# Patient Record
Sex: FEMALE | Race: WHITE | Employment: FULL TIME | URBAN - METROPOLITAN AREA
[De-identification: names, ages, dates, MRNs, and addresses within clinical notes are randomized per-mention and may not be internally consistent; named-entity substitution may affect disease eponyms.]

---

## 2018-12-12 ENCOUNTER — LAB ENCOUNTER (OUTPATIENT)
Dept: LAB | Age: 63
End: 2018-12-12
Attending: INTERNAL MEDICINE
Payer: COMMERCIAL

## 2018-12-12 ENCOUNTER — PRIOR ORIGINAL RECORDS (OUTPATIENT)
Dept: OTHER | Age: 63
End: 2018-12-12

## 2018-12-12 ENCOUNTER — HOSPITAL ENCOUNTER (OUTPATIENT)
Dept: CT IMAGING | Age: 63
Discharge: HOME | End: 2018-12-12
Attending: INTERNAL MEDICINE

## 2018-12-12 DIAGNOSIS — Z13.1 DIABETES MELLITUS SCREENING: ICD-10-CM

## 2018-12-12 DIAGNOSIS — I10 HYPERTENSION: Primary | ICD-10-CM

## 2018-12-12 DIAGNOSIS — E78.00 HYPERCHOLESTEROLEMIA: ICD-10-CM

## 2018-12-12 DIAGNOSIS — Z13.9 ENCOUNTER FOR SCREENING: ICD-10-CM

## 2018-12-12 PROCEDURE — 83036 HEMOGLOBIN GLYCOSYLATED A1C: CPT

## 2018-12-12 PROCEDURE — 80053 COMPREHEN METABOLIC PANEL: CPT

## 2018-12-12 PROCEDURE — 80061 LIPID PANEL: CPT

## 2018-12-12 PROCEDURE — 36415 COLL VENOUS BLD VENIPUNCTURE: CPT

## 2018-12-12 NOTE — PROGRESS NOTES
Pt. Seen at [facility] for Heart Scan.     Preliminary Score: 293    BP: 122/84    Cholestec lab results: Fasting  TC: 280  HDL:>100  LDL:NA  T  Glucose: 83  Pt to discuss management with MD    Preliminary results and risk factors reviewed with pt

## 2018-12-19 LAB
ALBUMIN: 4.1 G/DL
BUN: 12 MG/DL
CALCIUM: 9.8 MG/DL
CHLORIDE: 99 MEQ/L
CHOLESTEROL, TOTAL: 267 MG/DL
CREATININE, SERUM: 0.76 MG/DL
GLUCOSE: 105 MG/DL
HDL CHOLESTEROL: 88 MG/DL
LDL CHOLESTEROL: 159 MG/DL
NON-HDL CHOLESTEROL: 179 MG/DL
POTASSIUM, SERUM: 4.1 MEQ/L
PROTEIN, TOTAL: 7.3 G/DL
SODIUM: 137 MEQ/L
TRIGLYCERIDES: 98 MG/DL

## 2018-12-20 ENCOUNTER — HOSPITAL ENCOUNTER (OUTPATIENT)
Dept: CT IMAGING | Facility: HOSPITAL | Age: 63
Discharge: HOME | End: 2018-12-20
Attending: INTERNAL MEDICINE
Payer: COMMERCIAL

## 2018-12-20 DIAGNOSIS — I71.9 AORTIC ANEURYSM (HCC): ICD-10-CM

## 2018-12-20 PROCEDURE — 71275 CT ANGIOGRAPHY CHEST: CPT | Performed by: INTERNAL MEDICINE

## 2018-12-20 PROCEDURE — 82565 ASSAY OF CREATININE: CPT

## 2018-12-20 PROCEDURE — 71260 CT THORAX DX C+: CPT | Performed by: INTERNAL MEDICINE

## 2018-12-24 ENCOUNTER — PRIOR ORIGINAL RECORDS (OUTPATIENT)
Dept: OTHER | Age: 63
End: 2018-12-24

## 2018-12-26 ENCOUNTER — MYAURORA ACCOUNT LINK (OUTPATIENT)
Dept: OTHER | Age: 63
End: 2018-12-26

## 2018-12-26 ENCOUNTER — HOSPITAL ENCOUNTER (OUTPATIENT)
Dept: CV DIAGNOSTICS | Facility: HOSPITAL | Age: 63
Discharge: HOME | End: 2018-12-26
Attending: INTERNAL MEDICINE
Payer: COMMERCIAL

## 2018-12-26 ENCOUNTER — PRIOR ORIGINAL RECORDS (OUTPATIENT)
Dept: OTHER | Age: 63
End: 2018-12-26

## 2018-12-26 DIAGNOSIS — I71.9 AORTIC ANEURYSM WITHOUT RUPTURE, UNSPECIFIED PORTION OF AORTA (HCC): ICD-10-CM

## 2018-12-26 PROCEDURE — 93306 TTE W/DOPPLER COMPLETE: CPT | Performed by: INTERNAL MEDICINE

## 2019-01-04 ENCOUNTER — HOSPITAL ENCOUNTER (OUTPATIENT)
Dept: ULTRASOUND IMAGING | Age: 64
Discharge: HOME | End: 2019-01-04
Attending: INTERNAL MEDICINE

## 2019-01-04 DIAGNOSIS — Z13.9 ENCOUNTER FOR SCREENING: ICD-10-CM

## 2019-01-04 NOTE — PROGRESS NOTES
Pt seen at [facility] Stroke Screening tests:  PRELIMINARY results as follows:    Carotid US=Right ICA; #2 mild plaque                      Left ICA; #2 mild plaque  Abdominal Aorta US=normal      OW=021/76  Reviewed recent Cholesterol lab

## 2019-01-07 ENCOUNTER — PRIOR ORIGINAL RECORDS (OUTPATIENT)
Dept: OTHER | Age: 64
End: 2019-01-07

## 2019-02-28 VITALS
DIASTOLIC BLOOD PRESSURE: 72 MMHG | HEIGHT: 65 IN | BODY MASS INDEX: 25.16 KG/M2 | WEIGHT: 151 LBS | HEART RATE: 64 BPM | SYSTOLIC BLOOD PRESSURE: 110 MMHG

## 2019-04-19 RX ORDER — ATORVASTATIN CALCIUM 40 MG/1
TABLET, FILM COATED ORAL
COMMUNITY
Start: 2018-12-26 | End: 2019-10-28 | Stop reason: SDUPTHER

## 2019-04-19 RX ORDER — METOPROLOL SUCCINATE 50 MG/1
TABLET, EXTENDED RELEASE ORAL
COMMUNITY
Start: 2018-12-26

## 2019-04-19 RX ORDER — LOSARTAN POTASSIUM AND HYDROCHLOROTHIAZIDE 25; 100 MG/1; MG/1
TABLET ORAL
COMMUNITY
Start: 2018-12-26

## 2019-04-19 RX ORDER — DIFLUPREDNATE OPHTHALMIC 0.5 MG/ML
EMULSION OPHTHALMIC
COMMUNITY
End: 2020-12-10 | Stop reason: ALTCHOICE

## 2019-10-28 RX ORDER — ATORVASTATIN CALCIUM 40 MG/1
TABLET, FILM COATED ORAL
Qty: 90 TABLET | Refills: 0 | Status: SHIPPED | OUTPATIENT
Start: 2019-10-28

## 2019-12-23 RX ORDER — OLMESARTAN MEDOXOMIL AND HYDROCHLOROTHIAZIDE 40/25 40; 25 MG/1; MG/1
1 TABLET ORAL DAILY
COMMUNITY
Start: 2019-10-02 | End: 2020-02-07 | Stop reason: CLARIF

## 2019-12-24 ENCOUNTER — TELEPHONE (OUTPATIENT)
Dept: CARDIOLOGY | Age: 64
End: 2019-12-24

## 2019-12-26 ENCOUNTER — TELEPHONE (OUTPATIENT)
Dept: CARDIOLOGY | Age: 64
End: 2019-12-26

## 2019-12-27 ENCOUNTER — APPOINTMENT (OUTPATIENT)
Dept: CARDIOLOGY | Age: 64
End: 2019-12-27

## 2020-01-13 ENCOUNTER — TELEPHONE (OUTPATIENT)
Dept: CARDIOLOGY | Age: 65
End: 2020-01-13

## 2020-01-21 ENCOUNTER — TELEPHONE (OUTPATIENT)
Dept: CARDIOLOGY | Age: 65
End: 2020-01-21

## 2020-02-07 ENCOUNTER — APPOINTMENT (OUTPATIENT)
Dept: CARDIOLOGY | Age: 65
End: 2020-02-07

## 2020-02-07 ENCOUNTER — OFFICE VISIT (OUTPATIENT)
Dept: CARDIOLOGY | Age: 65
End: 2020-02-07

## 2020-02-07 VITALS
BODY MASS INDEX: 25.16 KG/M2 | HEART RATE: 82 BPM | HEIGHT: 65 IN | WEIGHT: 151 LBS | SYSTOLIC BLOOD PRESSURE: 110 MMHG | DIASTOLIC BLOOD PRESSURE: 68 MMHG | OXYGEN SATURATION: 97 %

## 2020-02-07 DIAGNOSIS — I10 ESSENTIAL HYPERTENSION: ICD-10-CM

## 2020-02-07 DIAGNOSIS — I71.20 AORTIC ANEURYSM, INTRATHORACIC (CMD): Primary | ICD-10-CM

## 2020-02-07 DIAGNOSIS — I25.10 CORONARY ARTERY DISEASE INVOLVING NATIVE HEART WITHOUT ANGINA PECTORIS, UNSPECIFIED VESSEL OR LESION TYPE: ICD-10-CM

## 2020-02-07 DIAGNOSIS — E78.5 HYPERLIPIDEMIA, UNSPECIFIED HYPERLIPIDEMIA TYPE: ICD-10-CM

## 2020-02-07 PROCEDURE — 3074F SYST BP LT 130 MM HG: CPT | Performed by: INTERNAL MEDICINE

## 2020-02-07 PROCEDURE — 99213 OFFICE O/P EST LOW 20 MIN: CPT | Performed by: INTERNAL MEDICINE

## 2020-02-07 PROCEDURE — 3078F DIAST BP <80 MM HG: CPT | Performed by: INTERNAL MEDICINE

## 2020-02-07 ASSESSMENT — PATIENT HEALTH QUESTIONNAIRE - PHQ9
1. LITTLE INTEREST OR PLEASURE IN DOING THINGS: NOT AT ALL
SUM OF ALL RESPONSES TO PHQ9 QUESTIONS 1 AND 2: 0
SUM OF ALL RESPONSES TO PHQ9 QUESTIONS 1 AND 2: 0
2. FEELING DOWN, DEPRESSED OR HOPELESS: NOT AT ALL

## 2020-02-12 ENCOUNTER — HOSPITAL ENCOUNTER (OUTPATIENT)
Dept: MRI IMAGING | Facility: HOSPITAL | Age: 65
Discharge: HOME | End: 2020-02-12
Attending: INTERNAL MEDICINE
Payer: COMMERCIAL

## 2020-02-12 DIAGNOSIS — I71.2 ANEURYSM, AORTA, THORACIC (HCC): ICD-10-CM

## 2020-02-12 PROCEDURE — 71555 MRI ANGIO CHEST W OR W/O DYE: CPT | Performed by: INTERNAL MEDICINE

## 2020-02-14 ENCOUNTER — TELEPHONE (OUTPATIENT)
Dept: CARDIOLOGY | Age: 65
End: 2020-02-14

## 2020-02-27 DIAGNOSIS — I71.20 AORTIC ANEURYSM, INTRATHORACIC (CMD): ICD-10-CM

## 2020-11-19 ENCOUNTER — TELEPHONE (OUTPATIENT)
Dept: CARDIOLOGY | Age: 65
End: 2020-11-19

## 2020-12-08 DIAGNOSIS — Z01.812 PRE-PROCEDURAL LABORATORY EXAMINATION: Primary | ICD-10-CM

## 2020-12-10 RX ORDER — KETOROLAC TROMETHAMINE 5 MG/ML
1 SOLUTION OPHTHALMIC 4 TIMES DAILY
COMMUNITY

## 2020-12-10 RX ORDER — ASPIRIN 81 MG/1
81 TABLET ORAL DAILY
COMMUNITY

## 2020-12-10 ASSESSMENT — ACTIVITIES OF DAILY LIVING (ADL)
ADL_BEFORE_ADMISSION: INDEPENDENT
NEEDS_ASSIST: NO
ADL_SCORE: 12
SENSORY_SUPPORT_DEVICES: EYEGLASSES

## 2020-12-11 ENCOUNTER — APPOINTMENT (OUTPATIENT)
Dept: CARDIOLOGY | Age: 65
End: 2020-12-11

## 2020-12-12 ENCOUNTER — LAB SERVICES (OUTPATIENT)
Dept: LAB | Age: 65
End: 2020-12-12

## 2020-12-12 DIAGNOSIS — Z11.52 ENCOUNTER FOR PREPROCEDURE SCREENING LABORATORY TESTING FOR COVID-19: Primary | ICD-10-CM

## 2020-12-12 DIAGNOSIS — Z01.812 ENCOUNTER FOR PREPROCEDURE SCREENING LABORATORY TESTING FOR COVID-19: Primary | ICD-10-CM

## 2020-12-12 LAB
SARS-COV-2 RNA RESP QL NAA+PROBE: NOT DETECTED
SERVICE CMNT-IMP: NORMAL
SERVICE CMNT-IMP: NORMAL

## 2020-12-12 PROCEDURE — U0003 INFECTIOUS AGENT DETECTION BY NUCLEIC ACID (DNA OR RNA); SEVERE ACUTE RESPIRATORY SYNDROME CORONAVIRUS 2 (SARS-COV-2) (CORONAVIRUS DISEASE [COVID-19]), AMPLIFIED PROBE TECHNIQUE, MAKING USE OF HIGH THROUGHPUT TECHNOLOGIES AS DESCRIBED BY CMS-2020-01-R: HCPCS | Performed by: OPHTHALMOLOGY

## 2020-12-14 ENCOUNTER — HOSPITAL ENCOUNTER (OUTPATIENT)
Age: 65
Discharge: HOME | End: 2020-12-14
Attending: OPHTHALMOLOGY | Admitting: OPHTHALMOLOGY

## 2020-12-14 ENCOUNTER — ANESTHESIA EVENT (OUTPATIENT)
Dept: SURGERY | Age: 65
End: 2020-12-14

## 2020-12-14 ENCOUNTER — ANESTHESIA (OUTPATIENT)
Dept: SURGERY | Age: 65
End: 2020-12-14

## 2020-12-14 VITALS
OXYGEN SATURATION: 95 % | BODY MASS INDEX: 24.46 KG/M2 | RESPIRATION RATE: 18 BRPM | HEIGHT: 64 IN | SYSTOLIC BLOOD PRESSURE: 117 MMHG | TEMPERATURE: 97.9 F | DIASTOLIC BLOOD PRESSURE: 70 MMHG | HEART RATE: 70 BPM | WEIGHT: 143.3 LBS

## 2020-12-14 PROCEDURE — V2632 POST CHMBR INTRAOCULAR LENS: HCPCS | Performed by: OPHTHALMOLOGY

## 2020-12-14 PROCEDURE — 13000001 HB PHASE II RECOVERY EA 30 MINUTES: Performed by: OPHTHALMOLOGY

## 2020-12-14 PROCEDURE — 13000038 HB MAJOR COMPLEX CASE S/U + 1ST 15 MIN: Performed by: OPHTHALMOLOGY

## 2020-12-14 PROCEDURE — 93010 ELECTROCARDIOGRAM REPORT: CPT | Performed by: INTERNAL MEDICINE

## 2020-12-14 PROCEDURE — 13000039 HB MAJOR COMPLEX CASE EA ADD MINUTE: Performed by: OPHTHALMOLOGY

## 2020-12-14 PROCEDURE — 13000002 HB ANESTHESIA  GENERAL  S/U + 1ST 15 MIN: Performed by: OPHTHALMOLOGY

## 2020-12-14 PROCEDURE — 10004451 HB PACU RECOVERY 1ST 30 MINUTES: Performed by: OPHTHALMOLOGY

## 2020-12-14 PROCEDURE — 10002800 HB RX 250 W HCPCS: Performed by: ANESTHESIOLOGY

## 2020-12-14 PROCEDURE — 10002801 HB RX 250 W/O HCPCS: Performed by: ANESTHESIOLOGY

## 2020-12-14 PROCEDURE — 10006023 HB SUPPLY 272: Performed by: OPHTHALMOLOGY

## 2020-12-14 PROCEDURE — 10002801 HB RX 250 W/O HCPCS: Performed by: OPHTHALMOLOGY

## 2020-12-14 PROCEDURE — 10004651 HB RX, NO CHARGE ITEM: Performed by: OPHTHALMOLOGY

## 2020-12-14 PROCEDURE — 10002803 HB RX 637

## 2020-12-14 PROCEDURE — 10002800 HB RX 250 W HCPCS

## 2020-12-14 PROCEDURE — 10004452 HB PACU ADDL 30 MINUTES: Performed by: OPHTHALMOLOGY

## 2020-12-14 PROCEDURE — 10006026 HB SUPPLY 276: Performed by: OPHTHALMOLOGY

## 2020-12-14 PROCEDURE — 10002807 HB RX 258: Performed by: ANESTHESIOLOGY

## 2020-12-14 PROCEDURE — 10002800 HB RX 250 W HCPCS: Performed by: OPHTHALMOLOGY

## 2020-12-14 PROCEDURE — 93005 ELECTROCARDIOGRAM TRACING: CPT | Performed by: OPHTHALMOLOGY

## 2020-12-14 PROCEDURE — 13000003 HB ANESTHESIA  GENERAL EA ADD MINUTE: Performed by: OPHTHALMOLOGY

## 2020-12-14 DEVICE — SINGLE-PIECE PMMA STERILE PCL (IOL/PC),12.5MM LENGTH, 7.0MM BICONVEX OPTIC,5-DEGREE "SLANT"(TM*) HAPTICS WITH EYELETS.                       *REG. U.S. PAT. & TM OFF.
Type: IMPLANTABLE DEVICE | Site: EYE | Status: FUNCTIONAL
Brand: ALCON SINGLE-PIECE PMMASLANT

## 2020-12-14 RX ORDER — GLYCOPYRROLATE 0.2 MG/ML
INJECTION, SOLUTION INTRAMUSCULAR; INTRAVENOUS PRN
Status: DISCONTINUED | OUTPATIENT
Start: 2020-12-14 | End: 2020-12-14

## 2020-12-14 RX ORDER — OFLOXACIN 3 MG/ML
1 SOLUTION/ DROPS OPHTHALMIC
Status: COMPLETED | OUTPATIENT
Start: 2020-12-14 | End: 2020-12-14

## 2020-12-14 RX ORDER — PROPOFOL 10 MG/ML
INJECTION, EMULSION INTRAVENOUS PRN
Status: DISCONTINUED | OUTPATIENT
Start: 2020-12-14 | End: 2020-12-14

## 2020-12-14 RX ORDER — ATROPINE SULFATE 10 MG/ML
1 SOLUTION/ DROPS OPHTHALMIC
Status: COMPLETED | OUTPATIENT
Start: 2020-12-14 | End: 2020-12-14

## 2020-12-14 RX ORDER — METOCLOPRAMIDE HYDROCHLORIDE 5 MG/ML
5 INJECTION INTRAMUSCULAR; INTRAVENOUS EVERY 6 HOURS PRN
Status: DISCONTINUED | OUTPATIENT
Start: 2020-12-14 | End: 2020-12-14 | Stop reason: HOSPADM

## 2020-12-14 RX ORDER — MIDAZOLAM HYDROCHLORIDE 1 MG/ML
2 INJECTION, SOLUTION INTRAMUSCULAR; INTRAVENOUS
Status: DISCONTINUED | OUTPATIENT
Start: 2020-12-14 | End: 2020-12-14 | Stop reason: HOSPADM

## 2020-12-14 RX ORDER — CYCLOPENTOLATE HYDROCHLORIDE 10 MG/ML
1 SOLUTION/ DROPS OPHTHALMIC
Status: COMPLETED | OUTPATIENT
Start: 2020-12-14 | End: 2020-12-14

## 2020-12-14 RX ORDER — NEOSTIGMINE METHYLSULFATE 4 MG/4 ML
SYRINGE (ML) INTRAVENOUS PRN
Status: DISCONTINUED | OUTPATIENT
Start: 2020-12-14 | End: 2020-12-14

## 2020-12-14 RX ORDER — EPHEDRINE SULFATE/0.9% NACL/PF 50 MG/10ML
5 SYRINGE (ML) INTRAVENOUS
Status: DISCONTINUED | OUTPATIENT
Start: 2020-12-14 | End: 2020-12-14 | Stop reason: HOSPADM

## 2020-12-14 RX ORDER — LIDOCAINE HYDROCHLORIDE 40 MG/ML
SOLUTION TOPICAL PRN
Status: DISCONTINUED | OUTPATIENT
Start: 2020-12-14 | End: 2020-12-14

## 2020-12-14 RX ORDER — ONDANSETRON 2 MG/ML
INJECTION INTRAMUSCULAR; INTRAVENOUS PRN
Status: DISCONTINUED | OUTPATIENT
Start: 2020-12-14 | End: 2020-12-14

## 2020-12-14 RX ORDER — LIDOCAINE HYDROCHLORIDE 10 MG/ML
INJECTION, SOLUTION INFILTRATION; PERINEURAL PRN
Status: DISCONTINUED | OUTPATIENT
Start: 2020-12-14 | End: 2020-12-14

## 2020-12-14 RX ORDER — DEXTROSE MONOHYDRATE 50 MG/ML
INJECTION, SOLUTION INTRAVENOUS CONTINUOUS PRN
Status: DISCONTINUED | OUTPATIENT
Start: 2020-12-14 | End: 2020-12-14 | Stop reason: HOSPADM

## 2020-12-14 RX ORDER — ROCURONIUM BROMIDE 10 MG/ML
INJECTION, SOLUTION INTRAVENOUS PRN
Status: DISCONTINUED | OUTPATIENT
Start: 2020-12-14 | End: 2020-12-14

## 2020-12-14 RX ORDER — BALANCED SALT SOLUTION 6.4; .75; .48; .3; 3.9; 1.7 MG/ML; MG/ML; MG/ML; MG/ML; MG/ML; MG/ML
SOLUTION OPHTHALMIC PRN
Status: DISCONTINUED | OUTPATIENT
Start: 2020-12-14 | End: 2020-12-14 | Stop reason: HOSPADM

## 2020-12-14 RX ORDER — ACETAMINOPHEN 650 MG/1
650 SUPPOSITORY RECTAL EVERY 4 HOURS PRN
Status: CANCELLED | OUTPATIENT
Start: 2020-12-14

## 2020-12-14 RX ORDER — ALBUTEROL SULFATE 2.5 MG/3ML
5 SOLUTION RESPIRATORY (INHALATION) ONCE
Status: DISCONTINUED | OUTPATIENT
Start: 2020-12-14 | End: 2020-12-14 | Stop reason: HOSPADM

## 2020-12-14 RX ORDER — ONDANSETRON 2 MG/ML
4 INJECTION INTRAMUSCULAR; INTRAVENOUS 2 TIMES DAILY PRN
Status: DISCONTINUED | OUTPATIENT
Start: 2020-12-14 | End: 2020-12-14 | Stop reason: HOSPADM

## 2020-12-14 RX ORDER — ACETAMINOPHEN 325 MG/1
650 TABLET ORAL ONCE
Status: COMPLETED | OUTPATIENT
Start: 2020-12-14 | End: 2020-12-14

## 2020-12-14 RX ORDER — MIDAZOLAM HYDROCHLORIDE 1 MG/ML
INJECTION, SOLUTION INTRAMUSCULAR; INTRAVENOUS PRN
Status: DISCONTINUED | OUTPATIENT
Start: 2020-12-14 | End: 2020-12-14

## 2020-12-14 RX ORDER — SODIUM CHLORIDE, SODIUM LACTATE, POTASSIUM CHLORIDE, CALCIUM CHLORIDE 600; 310; 30; 20 MG/100ML; MG/100ML; MG/100ML; MG/100ML
INJECTION, SOLUTION INTRAVENOUS CONTINUOUS
Status: DISCONTINUED | OUTPATIENT
Start: 2020-12-14 | End: 2020-12-14 | Stop reason: HOSPADM

## 2020-12-14 RX ORDER — DEXAMETHASONE SODIUM PHOSPHATE 4 MG/ML
4 INJECTION, SOLUTION INTRA-ARTICULAR; INTRALESIONAL; INTRAMUSCULAR; INTRAVENOUS; SOFT TISSUE
Status: DISCONTINUED | OUTPATIENT
Start: 2020-12-14 | End: 2020-12-14 | Stop reason: HOSPADM

## 2020-12-14 RX ORDER — EPHEDRINE SULFATE/0.9% NACL/PF 50 MG/10ML
SYRINGE (ML) INTRAVENOUS PRN
Status: DISCONTINUED | OUTPATIENT
Start: 2020-12-14 | End: 2020-12-14

## 2020-12-14 RX ORDER — ERYTHROMYCIN 5 MG/G
OINTMENT OPHTHALMIC PRN
Status: DISCONTINUED | OUTPATIENT
Start: 2020-12-14 | End: 2020-12-14 | Stop reason: HOSPADM

## 2020-12-14 RX ORDER — SODIUM CHLORIDE 9 MG/ML
INJECTION, SOLUTION INTRAVENOUS CONTINUOUS
Status: DISCONTINUED | OUTPATIENT
Start: 2020-12-14 | End: 2020-12-14 | Stop reason: HOSPADM

## 2020-12-14 RX ORDER — NALOXONE HCL 0.4 MG/ML
0.2 VIAL (ML) INJECTION EVERY 5 MIN PRN
Status: DISCONTINUED | OUTPATIENT
Start: 2020-12-14 | End: 2020-12-14 | Stop reason: HOSPADM

## 2020-12-14 RX ORDER — DEXTROSE MONOHYDRATE 25 G/50ML
25 INJECTION, SOLUTION INTRAVENOUS PRN
Status: DISCONTINUED | OUTPATIENT
Start: 2020-12-14 | End: 2020-12-14 | Stop reason: HOSPADM

## 2020-12-14 RX ORDER — DEXAMETHASONE SODIUM PHOSPHATE 4 MG/ML
INJECTION, SOLUTION INTRA-ARTICULAR; INTRALESIONAL; INTRAMUSCULAR; INTRAVENOUS; SOFT TISSUE PRN
Status: DISCONTINUED | OUTPATIENT
Start: 2020-12-14 | End: 2020-12-14

## 2020-12-14 RX ORDER — DEXAMETHASONE SODIUM PHOSPHATE 10 MG/ML
INJECTION INTRAMUSCULAR; INTRAVENOUS PRN
Status: DISCONTINUED | OUTPATIENT
Start: 2020-12-14 | End: 2020-12-14 | Stop reason: HOSPADM

## 2020-12-14 RX ORDER — 0.9 % SODIUM CHLORIDE 0.9 %
2 VIAL (ML) INJECTION EVERY 12 HOURS SCHEDULED
Status: CANCELLED | OUTPATIENT
Start: 2020-12-14

## 2020-12-14 RX ORDER — SODIUM CHLORIDE, SODIUM LACTATE, POTASSIUM CHLORIDE, CALCIUM CHLORIDE 600; 310; 30; 20 MG/100ML; MG/100ML; MG/100ML; MG/100ML
INJECTION, SOLUTION INTRAVENOUS CONTINUOUS PRN
Status: DISCONTINUED | OUTPATIENT
Start: 2020-12-14 | End: 2020-12-14

## 2020-12-14 RX ORDER — PREDNISOLONE ACETATE 10 MG/ML
1 SUSPENSION/ DROPS OPHTHALMIC
Status: COMPLETED | OUTPATIENT
Start: 2020-12-14 | End: 2020-12-14

## 2020-12-14 RX ORDER — HYDRALAZINE HYDROCHLORIDE 20 MG/ML
5 INJECTION INTRAMUSCULAR; INTRAVENOUS EVERY 10 MIN PRN
Status: DISCONTINUED | OUTPATIENT
Start: 2020-12-14 | End: 2020-12-14 | Stop reason: HOSPADM

## 2020-12-14 RX ORDER — PHENYLEPHRINE HYDROCHLORIDE 25 MG/ML
1 SOLUTION/ DROPS OPHTHALMIC
Status: COMPLETED | OUTPATIENT
Start: 2020-12-14 | End: 2020-12-14

## 2020-12-14 RX ORDER — ACETAMINOPHEN 325 MG/1
650 TABLET ORAL EVERY 4 HOURS PRN
Status: CANCELLED | OUTPATIENT
Start: 2020-12-14

## 2020-12-14 RX ADMIN — OFLOXACIN 1 DROP: 3 SOLUTION/ DROPS OPHTHALMIC at 13:50

## 2020-12-14 RX ADMIN — PHENYLEPHRINE HYDROCHLORIDE 1 DROP: 25 SOLUTION/ DROPS OPHTHALMIC at 13:50

## 2020-12-14 RX ADMIN — CYCLOPENTOLATE HYDROCHLORIDE 1 DROP: 10 SOLUTION/ DROPS OPHTHALMIC at 13:55

## 2020-12-14 RX ADMIN — ROCURONIUM BROMIDE 5 MG: 50 INJECTION, SOLUTION INTRAVENOUS at 16:15

## 2020-12-14 RX ADMIN — MIDAZOLAM HYDROCHLORIDE 2 MG: 1 INJECTION, SOLUTION INTRAMUSCULAR; INTRAVENOUS at 15:02

## 2020-12-14 RX ADMIN — ONDANSETRON 4 MG: 2 INJECTION INTRAMUSCULAR; INTRAVENOUS at 15:10

## 2020-12-14 RX ADMIN — HYDROMORPHONE HYDROCHLORIDE 0.5 MG: 1 INJECTION, SOLUTION INTRAMUSCULAR; INTRAVENOUS; SUBCUTANEOUS at 18:32

## 2020-12-14 RX ADMIN — LIDOCAINE HYDROCHLORIDE 1 APPLICATION.: 40 SOLUTION TOPICAL at 15:12

## 2020-12-14 RX ADMIN — CYCLOPENTOLATE HYDROCHLORIDE 1 DROP: 10 SOLUTION/ DROPS OPHTHALMIC at 13:50

## 2020-12-14 RX ADMIN — FENTANYL CITRATE 50 MCG: 50 INJECTION, SOLUTION INTRAMUSCULAR; INTRAVENOUS at 15:07

## 2020-12-14 RX ADMIN — SODIUM CHLORIDE, POTASSIUM CHLORIDE, SODIUM LACTATE AND CALCIUM CHLORIDE: 600; 310; 30; 20 INJECTION, SOLUTION INTRAVENOUS at 15:54

## 2020-12-14 RX ADMIN — ATROPINE SULFATE 1 DROP: 10 SOLUTION/ DROPS OPHTHALMIC at 13:55

## 2020-12-14 RX ADMIN — ATROPINE SULFATE 1 DROP: 10 SOLUTION/ DROPS OPHTHALMIC at 13:50

## 2020-12-14 RX ADMIN — OFLOXACIN 1 DROP: 3 SOLUTION/ DROPS OPHTHALMIC at 14:00

## 2020-12-14 RX ADMIN — LIDOCAINE HYDROCHLORIDE 5 ML: 10 INJECTION, SOLUTION INFILTRATION; PERINEURAL at 15:10

## 2020-12-14 RX ADMIN — OFLOXACIN 1 DROP: 3 SOLUTION/ DROPS OPHTHALMIC at 13:55

## 2020-12-14 RX ADMIN — PREDNISOLONE ACETATE 1 DROP: 10 SUSPENSION/ DROPS OPHTHALMIC at 13:50

## 2020-12-14 RX ADMIN — NEOSTIGMINE METHYLSULFATE 3 MG: 1 INJECTION, SOLUTION INTRAVENOUS at 17:39

## 2020-12-14 RX ADMIN — PROPOFOL 100 MCG/KG/MIN: 10 INJECTION, EMULSION INTRAVENOUS at 15:16

## 2020-12-14 RX ADMIN — CYCLOPENTOLATE HYDROCHLORIDE 1 DROP: 10 SOLUTION/ DROPS OPHTHALMIC at 14:00

## 2020-12-14 RX ADMIN — ROCURONIUM BROMIDE 5 MG: 50 INJECTION, SOLUTION INTRAVENOUS at 16:31

## 2020-12-14 RX ADMIN — ATROPINE SULFATE 1 DROP: 10 SOLUTION/ DROPS OPHTHALMIC at 14:00

## 2020-12-14 RX ADMIN — PHENYLEPHRINE HYDROCHLORIDE 1 DROP: 25 SOLUTION/ DROPS OPHTHALMIC at 14:00

## 2020-12-14 RX ADMIN — ROCURONIUM BROMIDE 30 MG: 50 INJECTION, SOLUTION INTRAVENOUS at 15:15

## 2020-12-14 RX ADMIN — PROPOFOL 200 MG: 10 INJECTION, EMULSION INTRAVENOUS at 15:10

## 2020-12-14 RX ADMIN — PROPOFOL 20 MG: 10 INJECTION, EMULSION INTRAVENOUS at 16:11

## 2020-12-14 RX ADMIN — Medication 60 MG: at 15:10

## 2020-12-14 RX ADMIN — DEXAMETHASONE SODIUM PHOSPHATE 4 MG: 4 INJECTION, SOLUTION INTRAMUSCULAR; INTRAVENOUS at 15:11

## 2020-12-14 RX ADMIN — PHENYLEPHRINE HYDROCHLORIDE 1 DROP: 25 SOLUTION/ DROPS OPHTHALMIC at 13:55

## 2020-12-14 RX ADMIN — HYDROMORPHONE HYDROCHLORIDE 0.5 MG: 1 INJECTION, SOLUTION INTRAMUSCULAR; INTRAVENOUS; SUBCUTANEOUS at 18:08

## 2020-12-14 RX ADMIN — SODIUM CHLORIDE, POTASSIUM CHLORIDE, SODIUM LACTATE AND CALCIUM CHLORIDE: 600; 310; 30; 20 INJECTION, SOLUTION INTRAVENOUS at 15:05

## 2020-12-14 RX ADMIN — ACETAMINOPHEN 650 MG: 325 TABLET ORAL at 19:13

## 2020-12-14 RX ADMIN — Medication 10 MG: at 16:15

## 2020-12-14 RX ADMIN — KETOROLAC TROMETHAMINE 30 MG: 15 INJECTION, SOLUTION INTRAMUSCULAR; INTRAVENOUS at 15:10

## 2020-12-14 RX ADMIN — GLYCOPYRROLATE 0.6 MG: 1 INJECTION INTRAMUSCULAR; INTRAVENOUS at 17:39

## 2020-12-14 RX ADMIN — Medication 10 MG: at 15:53

## 2020-12-14 ASSESSMENT — PAIN SCALES - GENERAL
PAINLEVEL_OUTOF10: 1
PAINLEVEL_OUTOF10: 8
PAINLEVEL_OUTOF10: 3
PAINLEVEL_OUTOF10: 0
PAINLEVEL_OUTOF10: 4

## 2020-12-15 LAB
ATRIAL RATE (BPM): 65
P AXIS (DEGREES): 17
PR-INTERVAL (MSEC): 164
QRS-INTERVAL (MSEC): 90
QT-INTERVAL (MSEC): 400
QTC: 416
R AXIS (DEGREES): -15
REPORT TEXT: NORMAL
T AXIS (DEGREES): 5
VENTRICULAR RATE EKG/MIN (BPM): 65

## 2020-12-15 RX ORDER — PANTOPRAZOLE SODIUM 40 MG/1
40 TABLET, DELAYED RELEASE ORAL DAILY
COMMUNITY
Start: 2020-11-21

## 2020-12-15 RX ORDER — PREDNISOLONE ACETATE 10 MG/ML
SUSPENSION/ DROPS OPHTHALMIC
COMMUNITY

## 2020-12-15 RX ORDER — OLMESARTAN MEDOXOMIL AND HYDROCHLOROTHIAZIDE 40/25 40; 25 MG/1; MG/1
1 TABLET ORAL DAILY
COMMUNITY
Start: 2020-11-08 | End: 2020-12-18

## 2020-12-18 ENCOUNTER — OFFICE VISIT (OUTPATIENT)
Dept: CARDIOLOGY | Age: 65
End: 2020-12-18

## 2020-12-18 VITALS
SYSTOLIC BLOOD PRESSURE: 116 MMHG | OXYGEN SATURATION: 98 % | HEART RATE: 78 BPM | WEIGHT: 151 LBS | BODY MASS INDEX: 25.16 KG/M2 | HEIGHT: 65 IN | DIASTOLIC BLOOD PRESSURE: 74 MMHG

## 2020-12-18 DIAGNOSIS — Z82.49 FAMILY HISTORY OF EARLY CAD: ICD-10-CM

## 2020-12-18 DIAGNOSIS — E78.5 HYPERLIPIDEMIA, UNSPECIFIED HYPERLIPIDEMIA TYPE: ICD-10-CM

## 2020-12-18 DIAGNOSIS — I10 ESSENTIAL HYPERTENSION: Primary | ICD-10-CM

## 2020-12-18 DIAGNOSIS — I71.20 AORTIC ANEURYSM, INTRATHORACIC (CMD): ICD-10-CM

## 2020-12-18 DIAGNOSIS — I25.10 CORONARY ARTERY DISEASE INVOLVING NATIVE HEART WITHOUT ANGINA PECTORIS, UNSPECIFIED VESSEL OR LESION TYPE: ICD-10-CM

## 2020-12-18 PROBLEM — R94.31 ABNORMAL ELECTROCARDIOGRAM (ECG) (EKG): Status: ACTIVE | Noted: 2020-12-18

## 2020-12-18 PROCEDURE — 3078F DIAST BP <80 MM HG: CPT | Performed by: INTERNAL MEDICINE

## 2020-12-18 PROCEDURE — 3074F SYST BP LT 130 MM HG: CPT | Performed by: INTERNAL MEDICINE

## 2020-12-18 PROCEDURE — 99213 OFFICE O/P EST LOW 20 MIN: CPT | Performed by: INTERNAL MEDICINE

## 2020-12-18 ASSESSMENT — PATIENT HEALTH QUESTIONNAIRE - PHQ9
1. LITTLE INTEREST OR PLEASURE IN DOING THINGS: NOT AT ALL
SUM OF ALL RESPONSES TO PHQ9 QUESTIONS 1 AND 2: 0
2. FEELING DOWN, DEPRESSED OR HOPELESS: NOT AT ALL
CLINICAL INTERPRETATION OF PHQ2 SCORE: NO FURTHER SCREENING NEEDED
SUM OF ALL RESPONSES TO PHQ9 QUESTIONS 1 AND 2: 0
CLINICAL INTERPRETATION OF PHQ9 SCORE: NO FURTHER SCREENING NEEDED

## 2021-01-10 ENCOUNTER — ANESTHESIA EVENT (OUTPATIENT)
Dept: SURGERY | Facility: HOSPITAL | Age: 66
DRG: 343 | End: 2021-01-10
Payer: COMMERCIAL

## 2021-01-10 ENCOUNTER — HOSPITAL ENCOUNTER (INPATIENT)
Facility: HOSPITAL | Age: 66
LOS: 1 days | Discharge: HOME | DRG: 343 | End: 2021-01-11
Attending: EMERGENCY MEDICINE | Admitting: HOSPITALIST
Payer: COMMERCIAL

## 2021-01-10 ENCOUNTER — ANESTHESIA (OUTPATIENT)
Dept: SURGERY | Facility: HOSPITAL | Age: 66
DRG: 343 | End: 2021-01-10
Payer: COMMERCIAL

## 2021-01-10 ENCOUNTER — APPOINTMENT (OUTPATIENT)
Dept: CT IMAGING | Facility: HOSPITAL | Age: 66
DRG: 343 | End: 2021-01-10
Attending: EMERGENCY MEDICINE
Payer: COMMERCIAL

## 2021-01-10 DIAGNOSIS — K35.80 ACUTE APPENDICITIS: ICD-10-CM

## 2021-01-10 DIAGNOSIS — K35.80 ACUTE APPENDICITIS, UNSPECIFIED ACUTE APPENDICITIS TYPE: Primary | ICD-10-CM

## 2021-01-10 LAB
ALBUMIN SERPL-MCNC: 3.6 G/DL (ref 3.4–5)
ALP LIVER SERPL-CCNC: 89 U/L
ALT SERPL-CCNC: 22 U/L
ANION GAP SERPL CALC-SCNC: 3 MMOL/L (ref 0–18)
AST SERPL-CCNC: 22 U/L (ref 15–37)
BASOPHILS # BLD AUTO: 0.03 X10(3) UL (ref 0–0.2)
BASOPHILS NFR BLD AUTO: 0.2 %
BILIRUB DIRECT SERPL-MCNC: 0.1 MG/DL (ref 0–0.2)
BILIRUB SERPL-MCNC: 0.6 MG/DL (ref 0.1–2)
BILIRUB UR QL: NEGATIVE
BUN BLD-MCNC: 21 MG/DL (ref 7–18)
BUN/CREAT SERPL: 18.1 (ref 10–20)
CALCIUM BLD-MCNC: 9.1 MG/DL (ref 8.5–10.1)
CHLORIDE SERPL-SCNC: 103 MMOL/L (ref 98–112)
CLARITY UR: CLEAR
CO2 SERPL-SCNC: 31 MMOL/L (ref 21–32)
COLOR UR: YELLOW
CREAT BLD-MCNC: 1.16 MG/DL
DEPRECATED RDW RBC AUTO: 51.1 FL (ref 35.1–46.3)
EOSINOPHIL # BLD AUTO: 0.01 X10(3) UL (ref 0–0.7)
EOSINOPHIL NFR BLD AUTO: 0.1 %
ERYTHROCYTE [DISTWIDTH] IN BLOOD BY AUTOMATED COUNT: 13.2 % (ref 11–15)
GLUCOSE BLD-MCNC: 136 MG/DL (ref 70–99)
GLUCOSE UR-MCNC: NEGATIVE MG/DL
HCT VFR BLD AUTO: 31.9 %
HGB BLD-MCNC: 10.8 G/DL
HGB UR QL STRIP.AUTO: NEGATIVE
IMM GRANULOCYTES # BLD AUTO: 0.04 X10(3) UL (ref 0–1)
IMM GRANULOCYTES NFR BLD: 0.3 %
KETONES UR-MCNC: NEGATIVE MG/DL
LEUKOCYTE ESTERASE UR QL STRIP.AUTO: NEGATIVE
LIPASE SERPL-CCNC: 84 U/L (ref 73–393)
LYMPHOCYTES # BLD AUTO: 0.83 X10(3) UL (ref 1–4)
LYMPHOCYTES NFR BLD AUTO: 5.8 %
M PROTEIN MFR SERPL ELPH: 7.6 G/DL (ref 6.4–8.2)
MCH RBC QN AUTO: 35.9 PG (ref 26–34)
MCHC RBC AUTO-ENTMCNC: 33.9 G/DL (ref 31–37)
MCV RBC AUTO: 106 FL
MONOCYTES # BLD AUTO: 1.17 X10(3) UL (ref 0.1–1)
MONOCYTES NFR BLD AUTO: 8.2 %
NEUTROPHILS # BLD AUTO: 12.22 X10 (3) UL (ref 1.5–7.7)
NEUTROPHILS # BLD AUTO: 12.22 X10(3) UL (ref 1.5–7.7)
NEUTROPHILS NFR BLD AUTO: 85.4 %
NITRITE UR QL STRIP.AUTO: NEGATIVE
OSMOLALITY SERPL CALC.SUM OF ELEC: 289 MOSM/KG (ref 275–295)
PH UR: 7 [PH] (ref 5–8)
PLATELET # BLD AUTO: 242 10(3)UL (ref 150–450)
POTASSIUM SERPL-SCNC: 4.7 MMOL/L (ref 3.5–5.1)
PROT UR-MCNC: NEGATIVE MG/DL
RBC # BLD AUTO: 3.01 X10(6)UL
SARS-COV-2 RNA RESP QL NAA+PROBE: NOT DETECTED
SODIUM SERPL-SCNC: 137 MMOL/L (ref 136–145)
SP GR UR STRIP: 1.04 (ref 1–1.03)
UROBILINOGEN UR STRIP-ACNC: <2
WBC # BLD AUTO: 14.3 X10(3) UL (ref 4–11)

## 2021-01-10 PROCEDURE — 74177 CT ABD & PELVIS W/CONTRAST: CPT | Performed by: EMERGENCY MEDICINE

## 2021-01-10 PROCEDURE — 0DTJ4ZZ RESECTION OF APPENDIX, PERCUTANEOUS ENDOSCOPIC APPROACH: ICD-10-PCS | Performed by: SURGERY

## 2021-01-10 PROCEDURE — 99223 1ST HOSP IP/OBS HIGH 75: CPT | Performed by: HOSPITALIST

## 2021-01-10 PROCEDURE — 99222 1ST HOSP IP/OBS MODERATE 55: CPT | Performed by: INTERNAL MEDICINE

## 2021-01-10 RX ORDER — METOPROLOL SUCCINATE 50 MG/1
50 TABLET, EXTENDED RELEASE ORAL DAILY
COMMUNITY

## 2021-01-10 RX ORDER — BUPIVACAINE HYDROCHLORIDE 2.5 MG/ML
INJECTION, SOLUTION EPIDURAL; INFILTRATION; INTRACAUDAL AS NEEDED
Status: DISCONTINUED | OUTPATIENT
Start: 2021-01-10 | End: 2021-01-10 | Stop reason: HOSPADM

## 2021-01-10 RX ORDER — HEPARIN SODIUM 5000 [USP'U]/ML
5000 INJECTION, SOLUTION INTRAVENOUS; SUBCUTANEOUS EVERY 8 HOURS SCHEDULED
Status: DISCONTINUED | OUTPATIENT
Start: 2021-01-10 | End: 2021-01-11

## 2021-01-10 RX ORDER — MORPHINE SULFATE 4 MG/ML
4 INJECTION, SOLUTION INTRAMUSCULAR; INTRAVENOUS EVERY 10 MIN PRN
Status: DISCONTINUED | OUTPATIENT
Start: 2021-01-10 | End: 2021-01-10 | Stop reason: HOSPADM

## 2021-01-10 RX ORDER — LIDOCAINE HYDROCHLORIDE 10 MG/ML
INJECTION, SOLUTION EPIDURAL; INFILTRATION; INTRACAUDAL; PERINEURAL AS NEEDED
Status: DISCONTINUED | OUTPATIENT
Start: 2021-01-10 | End: 2021-01-10 | Stop reason: SURG

## 2021-01-10 RX ORDER — METOCLOPRAMIDE HYDROCHLORIDE 5 MG/ML
10 INJECTION INTRAMUSCULAR; INTRAVENOUS EVERY 8 HOURS PRN
Status: DISCONTINUED | OUTPATIENT
Start: 2021-01-10 | End: 2021-01-11

## 2021-01-10 RX ORDER — MORPHINE SULFATE 2 MG/ML
2 INJECTION, SOLUTION INTRAMUSCULAR; INTRAVENOUS EVERY 2 HOUR PRN
Status: DISCONTINUED | OUTPATIENT
Start: 2021-01-10 | End: 2021-01-11

## 2021-01-10 RX ORDER — HYDROMORPHONE HYDROCHLORIDE 1 MG/ML
0.4 INJECTION, SOLUTION INTRAMUSCULAR; INTRAVENOUS; SUBCUTANEOUS EVERY 5 MIN PRN
Status: DISCONTINUED | OUTPATIENT
Start: 2021-01-10 | End: 2021-01-10 | Stop reason: HOSPADM

## 2021-01-10 RX ORDER — SODIUM CHLORIDE 9 MG/ML
INJECTION, SOLUTION INTRAVENOUS CONTINUOUS
Status: DISCONTINUED | OUTPATIENT
Start: 2021-01-10 | End: 2021-01-10

## 2021-01-10 RX ORDER — ONDANSETRON 2 MG/ML
INJECTION INTRAMUSCULAR; INTRAVENOUS AS NEEDED
Status: DISCONTINUED | OUTPATIENT
Start: 2021-01-10 | End: 2021-01-10 | Stop reason: SURG

## 2021-01-10 RX ORDER — ROCURONIUM BROMIDE 10 MG/ML
INJECTION, SOLUTION INTRAVENOUS AS NEEDED
Status: DISCONTINUED | OUTPATIENT
Start: 2021-01-10 | End: 2021-01-10 | Stop reason: SURG

## 2021-01-10 RX ORDER — NALOXONE HYDROCHLORIDE 0.4 MG/ML
80 INJECTION, SOLUTION INTRAMUSCULAR; INTRAVENOUS; SUBCUTANEOUS AS NEEDED
Status: DISCONTINUED | OUTPATIENT
Start: 2021-01-10 | End: 2021-01-10 | Stop reason: HOSPADM

## 2021-01-10 RX ORDER — MORPHINE SULFATE 2 MG/ML
1 INJECTION, SOLUTION INTRAMUSCULAR; INTRAVENOUS EVERY 2 HOUR PRN
Status: DISCONTINUED | OUTPATIENT
Start: 2021-01-10 | End: 2021-01-11

## 2021-01-10 RX ORDER — DEXAMETHASONE SODIUM PHOSPHATE 4 MG/ML
VIAL (ML) INJECTION AS NEEDED
Status: DISCONTINUED | OUTPATIENT
Start: 2021-01-10 | End: 2021-01-10 | Stop reason: SURG

## 2021-01-10 RX ORDER — HYDROCODONE BITARTRATE AND ACETAMINOPHEN 5; 325 MG/1; MG/1
1 TABLET ORAL AS NEEDED
Status: DISCONTINUED | OUTPATIENT
Start: 2021-01-10 | End: 2021-01-10 | Stop reason: HOSPADM

## 2021-01-10 RX ORDER — HYDROMORPHONE HYDROCHLORIDE 1 MG/ML
0.2 INJECTION, SOLUTION INTRAMUSCULAR; INTRAVENOUS; SUBCUTANEOUS EVERY 5 MIN PRN
Status: DISCONTINUED | OUTPATIENT
Start: 2021-01-10 | End: 2021-01-10 | Stop reason: HOSPADM

## 2021-01-10 RX ORDER — ONDANSETRON 2 MG/ML
4 INJECTION INTRAMUSCULAR; INTRAVENOUS EVERY 6 HOURS PRN
Status: DISCONTINUED | OUTPATIENT
Start: 2021-01-10 | End: 2021-01-11

## 2021-01-10 RX ORDER — PROCHLORPERAZINE EDISYLATE 5 MG/ML
5 INJECTION INTRAMUSCULAR; INTRAVENOUS ONCE AS NEEDED
Status: DISCONTINUED | OUTPATIENT
Start: 2021-01-10 | End: 2021-01-10 | Stop reason: HOSPADM

## 2021-01-10 RX ORDER — HYDROMORPHONE HYDROCHLORIDE 1 MG/ML
0.6 INJECTION, SOLUTION INTRAMUSCULAR; INTRAVENOUS; SUBCUTANEOUS EVERY 5 MIN PRN
Status: DISCONTINUED | OUTPATIENT
Start: 2021-01-10 | End: 2021-01-10 | Stop reason: HOSPADM

## 2021-01-10 RX ORDER — HYDROCODONE BITARTRATE AND ACETAMINOPHEN 5; 325 MG/1; MG/1
1 TABLET ORAL EVERY 4 HOURS PRN
Status: DISCONTINUED | OUTPATIENT
Start: 2021-01-10 | End: 2021-01-11

## 2021-01-10 RX ORDER — MORPHINE SULFATE 4 MG/ML
4 INJECTION, SOLUTION INTRAMUSCULAR; INTRAVENOUS EVERY 2 HOUR PRN
Status: DISCONTINUED | OUTPATIENT
Start: 2021-01-10 | End: 2021-01-11

## 2021-01-10 RX ORDER — MORPHINE SULFATE 4 MG/ML
4 INJECTION, SOLUTION INTRAMUSCULAR; INTRAVENOUS ONCE
Status: COMPLETED | OUTPATIENT
Start: 2021-01-10 | End: 2021-01-10

## 2021-01-10 RX ORDER — OLMESARTAN MEDOXOMIL AND HYDROCHLOROTHIAZIDE 40/25 40; 25 MG/1; MG/1
1 TABLET ORAL DAILY
COMMUNITY

## 2021-01-10 RX ORDER — HYDROCODONE BITARTRATE AND ACETAMINOPHEN 5; 325 MG/1; MG/1
2 TABLET ORAL AS NEEDED
Status: DISCONTINUED | OUTPATIENT
Start: 2021-01-10 | End: 2021-01-10 | Stop reason: HOSPADM

## 2021-01-10 RX ORDER — HYDROCODONE BITARTRATE AND ACETAMINOPHEN 5; 325 MG/1; MG/1
2 TABLET ORAL EVERY 4 HOURS PRN
Status: DISCONTINUED | OUTPATIENT
Start: 2021-01-10 | End: 2021-01-11

## 2021-01-10 RX ORDER — ONDANSETRON 2 MG/ML
4 INJECTION INTRAMUSCULAR; INTRAVENOUS ONCE AS NEEDED
Status: DISCONTINUED | OUTPATIENT
Start: 2021-01-10 | End: 2021-01-10 | Stop reason: HOSPADM

## 2021-01-10 RX ORDER — SODIUM CHLORIDE, SODIUM LACTATE, POTASSIUM CHLORIDE, CALCIUM CHLORIDE 600; 310; 30; 20 MG/100ML; MG/100ML; MG/100ML; MG/100ML
INJECTION, SOLUTION INTRAVENOUS CONTINUOUS
Status: DISCONTINUED | OUTPATIENT
Start: 2021-01-10 | End: 2021-01-10 | Stop reason: HOSPADM

## 2021-01-10 RX ORDER — SODIUM CHLORIDE 9 MG/ML
INJECTION, SOLUTION INTRAVENOUS CONTINUOUS
Status: DISCONTINUED | OUTPATIENT
Start: 2021-01-10 | End: 2021-01-11

## 2021-01-10 RX ORDER — MORPHINE SULFATE 4 MG/ML
2 INJECTION, SOLUTION INTRAMUSCULAR; INTRAVENOUS EVERY 10 MIN PRN
Status: DISCONTINUED | OUTPATIENT
Start: 2021-01-10 | End: 2021-01-10 | Stop reason: HOSPADM

## 2021-01-10 RX ORDER — ATORVASTATIN CALCIUM 40 MG/1
40 TABLET, FILM COATED ORAL NIGHTLY
COMMUNITY

## 2021-01-10 RX ORDER — ACETAMINOPHEN 325 MG/1
650 TABLET ORAL EVERY 4 HOURS PRN
Status: DISCONTINUED | OUTPATIENT
Start: 2021-01-10 | End: 2021-01-11

## 2021-01-10 RX ORDER — MORPHINE SULFATE 10 MG/ML
6 INJECTION, SOLUTION INTRAMUSCULAR; INTRAVENOUS EVERY 10 MIN PRN
Status: DISCONTINUED | OUTPATIENT
Start: 2021-01-10 | End: 2021-01-10 | Stop reason: HOSPADM

## 2021-01-10 RX ORDER — ONDANSETRON 2 MG/ML
4 INJECTION INTRAMUSCULAR; INTRAVENOUS ONCE
Status: COMPLETED | OUTPATIENT
Start: 2021-01-10 | End: 2021-01-10

## 2021-01-10 RX ORDER — SODIUM CHLORIDE, SODIUM LACTATE, POTASSIUM CHLORIDE, CALCIUM CHLORIDE 600; 310; 30; 20 MG/100ML; MG/100ML; MG/100ML; MG/100ML
INJECTION, SOLUTION INTRAVENOUS CONTINUOUS PRN
Status: DISCONTINUED | OUTPATIENT
Start: 2021-01-10 | End: 2021-01-10 | Stop reason: SURG

## 2021-01-10 RX ORDER — GLYCOPYRROLATE 0.2 MG/ML
INJECTION, SOLUTION INTRAMUSCULAR; INTRAVENOUS AS NEEDED
Status: DISCONTINUED | OUTPATIENT
Start: 2021-01-10 | End: 2021-01-10 | Stop reason: SURG

## 2021-01-10 RX ADMIN — GLYCOPYRROLATE 0.2 MG: 0.2 INJECTION, SOLUTION INTRAMUSCULAR; INTRAVENOUS at 14:36:00

## 2021-01-10 RX ADMIN — ROCURONIUM BROMIDE 40 MG: 10 INJECTION, SOLUTION INTRAVENOUS at 14:36:00

## 2021-01-10 RX ADMIN — SODIUM CHLORIDE, SODIUM LACTATE, POTASSIUM CHLORIDE, CALCIUM CHLORIDE: 600; 310; 30; 20 INJECTION, SOLUTION INTRAVENOUS at 14:33:00

## 2021-01-10 RX ADMIN — LIDOCAINE HYDROCHLORIDE 50 MG: 10 INJECTION, SOLUTION EPIDURAL; INFILTRATION; INTRACAUDAL; PERINEURAL at 14:36:00

## 2021-01-10 RX ADMIN — SODIUM CHLORIDE, SODIUM LACTATE, POTASSIUM CHLORIDE, CALCIUM CHLORIDE: 600; 310; 30; 20 INJECTION, SOLUTION INTRAVENOUS at 15:24:00

## 2021-01-10 RX ADMIN — ONDANSETRON 4 MG: 2 INJECTION INTRAMUSCULAR; INTRAVENOUS at 14:36:00

## 2021-01-10 RX ADMIN — DEXAMETHASONE SODIUM PHOSPHATE 4 MG: 4 MG/ML VIAL (ML) INJECTION at 14:36:00

## 2021-01-10 NOTE — CONSULTS
[facility] HOSP - [facility]    Report of Consultation    Lan Odell Patient Status:  Emergency    10/14/1955 MRN [id number]   Location [address] Attending Jo Cho MD   Hosp Day # 0 PCP ERYN Farrar     Da disease. She had a MRA of the chest that shows an ascending aorta measuring 4.2 cm. Patient is recovering from knee problems. She normally is active and can walk several miles without symptoms. Past Medical History        History reviewed.  No pertinent ejection murmur upper sternal border  Lungs:  Clear without wheezes, rales, rhonchi or dullness. Normal excursions and effort. Abdomen:  Soft, -tender. BS-present. Extremities:  Without clubbing, cyanosis or edema. Peripheral pulses are 2+.   Neurologi

## 2021-01-10 NOTE — ED NOTES
Orders for admission, patient is aware of plan and ready to go upstairs. Any questions, please call ED RN ayaka at ext [phone]. Patient from home. Patient ambulates steady. Significant other/family at bedside. A/Ox4.       Drips/infusions: none    COVID

## 2021-01-10 NOTE — ED PROVIDER NOTES
Patient Seen in: [facility] AND [facility] Emergency Department      History   Patient presents with:  Abdominal Pain    Stated Complaint: abd pain     HPI/Subjective:   HPI    69-year-old female presents for evaluation of abdominal pain.   Patient reports she wa Palpations: Abdomen is soft. Tenderness: There is abdominal tenderness in the right lower quadrant. There is no guarding or rebound. Musculoskeletal: Normal range of motion. Skin:     General: Skin is warm and dry. Findings: No rash.    Neurol ABDOMEN+PELVIS(CONTRAST ONLY)(CPT=74177)   Final Result    PROCEDURE: CT ABDOMEN + PELVIS (CONTRAST ONLY) (CPT=74177)         COMPARISON: None. INDICATIONS: Right lower abdominal pain.          TECHNIQUE: CT images of the abdomen and pelvis were obt mass.  Pelvic organs appropriate for patient     age. BONES:   Moderate degenerative spondylosis. No significant bony lesion or     acute fracture. LUNG BASES: No visible pulmonary or pleural disease. OTHER: Negative. pertinent problems on file. to contribute to the complexity of this ED evaluation.     Oxygen Saturation: 100% on room air, Normal    Consults: Orders Placed This Encounter      ED Consult to General Surgery      ED Consult to Hospitalist      MD Discussion

## 2021-01-10 NOTE — BRIEF OP NOTE
Pre-Operative Diagnosis: Acute appendicitis [K35.80]     Post-Operative Diagnosis: Acute appendicitis [K35.80]      Procedure Performed:   Procedure(s):  LAPAROSCOPIC APPENDECTOMY    Surgeon(s) and Role:     * Juwan Remy MD - Primary    Assistant(s):

## 2021-01-10 NOTE — ANESTHESIA PREPROCEDURE EVALUATION
Anesthesia PreOp Note    HPI:     Ninfa Epstein is a 76year old female who presents for preoperative consultation requested by: Tyrese Dai MD    Date of Surgery: 1/10/2021    Procedure(s):  LAPAROSCOPIC APPENDECTOMY  Indication: Acute appendicitis file      Stress: Not on file    Relationships      Social connections        Talks on phone: Not on file        Gets together: Not on file        Attends Synagogue service: Not on file        Active member of club or organization: Not on file        Atten normal exam    Neuro/Psych - negative ROS     GI/Hepatic/Renal - negative ROS     Endo/Other - negative ROS   Abdominal  - normal exam               Anesthesia Plan:   ASA:  2  Plan:   General  Airway:  ETT  Post-op Pain Management: IV analgesics  Informed

## 2021-01-10 NOTE — OPERATIVE REPORT
[facility]    PATIENT'S NAME: Myrna José Luis   ATTENDING PHYSICIAN: Forrest Mayen MD   OPERATING PHYSICIAN: John Gannon.  dE Oconnell MD   PATIENT ACCOUNT#:   [id number]    LOCATION:  [address] [place] [address] #:   [id number]       DATE OF BIRTH: umbilical site. Went back in, irrigated the area, let the air out. Made sure the staple lines were both intact. There was no leak. The area was intact. We then irrigated along the pelvis and along the liver and right gutter. There was no purulence.

## 2021-01-10 NOTE — CONSULTS
[facility] HOSP - [facility]    Report of Consultation    Yaneth Latham Patient Status:  Emergency    10/14/1955 MRN [id number]   Location [address] Attending Sandip Cleary MD   Hosp Day # 0 PCP ERYN Chanel 99 %.    General: Alert, orientated x3. Cooperative. No apparent distress. HEENT: Exam is unremarkable. Without scleral icterus. Mucous membranes are moist.  Neck: No tenderness to palpitation.   Full range of motion to flexion and extension, lateral r surgery including bleeding, infection, abscess, conversion to open surgery, risk of bowel injury, pt understands and asks to proceed with laparoscopic appendectomy possible open appendectomy.         Jaimie De Souza  1/10/2021  12:09 PM

## 2021-01-10 NOTE — ANESTHESIA POSTPROCEDURE EVALUATION
Patient: Any Fuchsez    Procedure Summary     Date: 01/10/21 Room / Location: [facility] OR 04 / [facility] OR    Anesthesia Start: 7434 Anesthesia Stop: 6617    Procedure: LAPAROSCOPIC APPENDECTOMY (N/A Abdomen) Diagnosis:       Acute appendicitis

## 2021-01-10 NOTE — ANESTHESIA PROCEDURE NOTES
Airway  Urgency: Elective      General Information and Staff    Patient location during procedure: OR  Anesthesiologist: Adama Gu MD  Performed: anesthesiologist     Indications and Patient Condition  Indications for airway management: anesthesia

## 2021-01-10 NOTE — H&P
[facility] Patient Status:  Emergency    10/14/1955 MRN [id number]   Location [address] Attending Em Adorno MD   Hosp Day # 0 PCP ERYN Rosen     Date: negative  Behavioral/Psych: negative  Endocrine: negative    Physical Exam:   Vital Signs:  Blood pressure 138/82, pulse 82, temperature 97.9 °F (36.6 °C), temperature source Oral, resp. rate 16, height 5' 5\" (1.651 m), weight 149 lb (67.6 kg), SpO2 99 %. GFRNAA 49*   CA 9.1   ALB 3.6      K 4.7      CO2 31.0   ALKPHO 89   AST 22   ALT 22   BILT 0.6   TP 7.6       Ct Abdomen+pelvis(contrast Only)(cpt=74177)    Result Date: 1/10/2021  CONCLUSION:  1. Acute appendicitis.     Dictated by (CST): Pa

## 2021-01-11 ENCOUNTER — APPOINTMENT (OUTPATIENT)
Dept: CV DIAGNOSTICS | Facility: HOSPITAL | Age: 66
DRG: 343 | End: 2021-01-11
Attending: HOSPITALIST
Payer: COMMERCIAL

## 2021-01-11 VITALS
TEMPERATURE: 98 F | HEIGHT: 65 IN | DIASTOLIC BLOOD PRESSURE: 71 MMHG | SYSTOLIC BLOOD PRESSURE: 107 MMHG | OXYGEN SATURATION: 98 % | WEIGHT: 149 LBS | BODY MASS INDEX: 24.83 KG/M2 | HEART RATE: 67 BPM | RESPIRATION RATE: 16 BRPM

## 2021-01-11 LAB
ANION GAP SERPL CALC-SCNC: 5 MMOL/L (ref 0–18)
BASOPHILS # BLD AUTO: 0.01 X10(3) UL (ref 0–0.2)
BASOPHILS NFR BLD AUTO: 0.1 %
BUN BLD-MCNC: 15 MG/DL (ref 7–18)
BUN/CREAT SERPL: 14.2 (ref 10–20)
CALCIUM BLD-MCNC: 8.7 MG/DL (ref 8.5–10.1)
CHLORIDE SERPL-SCNC: 104 MMOL/L (ref 98–112)
CHOLEST SMN-MCNC: 197 MG/DL (ref ?–200)
CO2 SERPL-SCNC: 29 MMOL/L (ref 21–32)
CREAT BLD-MCNC: 1.06 MG/DL
DEPRECATED RDW RBC AUTO: 53.9 FL (ref 35.1–46.3)
EOSINOPHIL # BLD AUTO: 0 X10(3) UL (ref 0–0.7)
EOSINOPHIL NFR BLD AUTO: 0 %
ERYTHROCYTE [DISTWIDTH] IN BLOOD BY AUTOMATED COUNT: 13.5 % (ref 11–15)
GLUCOSE BLD-MCNC: 122 MG/DL (ref 70–99)
HCT VFR BLD AUTO: 28.8 %
HDLC SERPL-MCNC: 90 MG/DL (ref 40–59)
HGB BLD-MCNC: 9.3 G/DL
IMM GRANULOCYTES # BLD AUTO: 0.05 X10(3) UL (ref 0–1)
IMM GRANULOCYTES NFR BLD: 0.4 %
LDLC SERPL CALC-MCNC: 86 MG/DL (ref ?–100)
LYMPHOCYTES # BLD AUTO: 1.24 X10(3) UL (ref 1–4)
LYMPHOCYTES NFR BLD AUTO: 10.7 %
MCH RBC QN AUTO: 35 PG (ref 26–34)
MCHC RBC AUTO-ENTMCNC: 32.3 G/DL (ref 31–37)
MCV RBC AUTO: 108.3 FL
MONOCYTES # BLD AUTO: 1.01 X10(3) UL (ref 0.1–1)
MONOCYTES NFR BLD AUTO: 8.7 %
NEUTROPHILS # BLD AUTO: 9.3 X10 (3) UL (ref 1.5–7.7)
NEUTROPHILS # BLD AUTO: 9.3 X10(3) UL (ref 1.5–7.7)
NEUTROPHILS NFR BLD AUTO: 80.1 %
NONHDLC SERPL-MCNC: 107 MG/DL (ref ?–130)
OSMOLALITY SERPL CALC.SUM OF ELEC: 288 MOSM/KG (ref 275–295)
PLATELET # BLD AUTO: 211 10(3)UL (ref 150–450)
POTASSIUM SERPL-SCNC: 3.9 MMOL/L (ref 3.5–5.1)
RBC # BLD AUTO: 2.66 X10(6)UL
SODIUM SERPL-SCNC: 138 MMOL/L (ref 136–145)
TRIGL SERPL-MCNC: 104 MG/DL (ref 30–149)
VLDLC SERPL CALC-MCNC: 21 MG/DL (ref 0–30)
WBC # BLD AUTO: 11.6 X10(3) UL (ref 4–11)

## 2021-01-11 PROCEDURE — 93306 TTE W/DOPPLER COMPLETE: CPT | Performed by: HOSPITALIST

## 2021-01-11 PROCEDURE — 99232 SBSQ HOSP IP/OBS MODERATE 35: CPT | Performed by: INTERNAL MEDICINE

## 2021-01-11 PROCEDURE — 99239 HOSP IP/OBS DSCHRG MGMT >30: CPT | Performed by: HOSPITALIST

## 2021-01-11 RX ORDER — HYDROCODONE BITARTRATE AND ACETAMINOPHEN 5; 325 MG/1; MG/1
1 TABLET ORAL EVERY 6 HOURS PRN
Qty: 10 TABLET | Refills: 0 | Status: SHIPPED | OUTPATIENT
Start: 2021-01-11

## 2021-01-11 RX ORDER — ATORVASTATIN CALCIUM 40 MG/1
80 TABLET, FILM COATED ORAL NIGHTLY
Status: DISCONTINUED | OUTPATIENT
Start: 2021-01-11 | End: 2021-01-11

## 2021-01-11 NOTE — PROGRESS NOTES
[facility]    Progress Note    Any Frazier Patient Status:  Inpatient    10/14/1955 MRN [id number]   Location [facility] 4W/SW/SE Attending Yolie Hunter MD   Hosp Day # 1 PCP ERYN MORALEZ        Subjective:     R Only)(cpt=74177)    Result Date: 1/10/2021  CONCLUSION:  1. Acute appendicitis.     Dictated by (CST): Yulia Casillas MD on 1/10/2021 at 11:22 AM     Finalized by (CST): Yulia Casillas MD on 1/10/2021 at 11:29 AM          Ekg 12-lead    Re

## 2021-01-11 NOTE — PROGRESS NOTES
[facility]    Progress Note    Jackie Sofia Patient Status:  Inpatient    10/14/1955 MRN [id number]   Location [facility] 4W/SW/SE Attending Eve Burns MD   Hosp Day # 1 PCP ERYN MORALEZ         Assessment and Plan Subcutaneous Q8H [facility]   • piperacillin-tazobactam  3.375 g Intravenous Q8H             Results:     Lab Results   Component Value Date    WBC 11.6 (H) 01/11/2021    HGB 9.3 (L) 01/11/2021    HCT 28.8 (L) 01/11/2021    .0 01/11/2021    CREATSERUM 1.06

## 2021-01-11 NOTE — DISCHARGE SUMMARY
[facility]    Discharge Summary    Lan Odell Patient Status:  Inpatient    10/14/1955 MRN [id number]   Location [facility] 4W/SW/SE Attending Jo Cho MD   Hosp Day # 1 PCP Olive Morales     Date of Admission: appendicitis  Status post appendectomy POD 1  Tylenol for pain  norco script printed but patient may not fill it   General diet   Resume home meds   Stable for discharge today  No antibiotics at discharge      HTN  - cards on board  - hold PO meds   - echo CONTINUE taking these medications      Instructions Prescription details   atorvastatin 40 MG Tabs  Commonly known as: LIPITOR      Take 40 mg by mouth nightly. Refills: 0     Metoprolol Succinate ER 50 MG Tb24  Commonly known as:  Toprol XL      Take 5 pain medication can make you nauseated, bloated and constipated. Only use it for severe pain. Take it with food and discontinue if side effects occur. No alcohol or driving while taking prescription pain medications. Norco contains Tylenol (acetaminophen). in 1-2 weeks.               Time spent:  > 30 minutes    Carito Houser  1/11/2021

## 2021-01-11 NOTE — PLAN OF CARE
Problem: Patient Centered Care  Goal: Patient preferences are identified and integrated in the patient's plan of care  Description: Interventions:  - What would you like us to know as we care for you?  My cousin is Nathan Duncan   - Provide timely, complet deficits and behaviors that affect risk of falls.   - [place] fall precautions as indicated by assessment.  - Educate pt/family on patient safety including physical limitations  - Instruct pt to call for assistance with activity based on assessment  - Mod function  - Maintain adequate hydration with IV or PO as ordered and tolerated  - Evaluate effectiveness of GI medications  - Encourage mobilization and activity  - Obtain nutritional consult as needed  - Establish a toileting routine/schedule  - Consider

## 2021-01-11 NOTE — PLAN OF CARE
Discharge orders received from medical and surgery. Discharge instructions and follow up appointments printed and discussed with patient. Patients questions answered. Patient discharge home with printed prescription. Patient stable upon discharge.      Prob as appropriate and evaluate response  - Consider cultural and social influences on pain and pain management  - Manage/alleviate anxiety  - Utilize distraction and/or relaxation techniques  - Monitor for opioid side effects  - Notify MD/LIP if interventions if the patient needs post-hospital services based on physician/LIP order or complex needs related to functional status, cognitive ability or social support system  1/11/2021 1720 by Chantale Mina RN  Outcome: Adequate for Discharge  1/11/2021 0953 by [facility] RN  Outcome: Progressing

## 2021-01-11 NOTE — PLAN OF CARE
Patient alert and oriented, on room air. BP low in the AM, MD notified. Patient ambulating with standby assist. Voiding freely, passing gas, and belching. Patient states pain in the abdomen, Tylenol given. Abdominal incisions c/d/I.  IV fluids and IV zosyn distraction and/or relaxation techniques  - Monitor for opioid side effects  - Notify MD/LIP if interventions unsuccessful or patient reports new pain  - Anticipate increased pain with activity and pre-medicate as appropriate  Outcome: Progressing     Prob tolerated  - Nasogastric tube to low intermittent suction as ordered  - Evaluate effectiveness of ordered antiemetic medications  - Provide nonpharmacologic comfort measures as appropriate  - Advance diet as tolerated, if ordered  - Obtain nutritional cons

## 2021-01-11 NOTE — PROGRESS NOTES
[facility]    Progress Note    Yaneth Latham Patient Status:  Inpatient    10/14/1955 MRN [id number]   Location [facility] 4W/SW/SE Attending Bora Vázquez MD   Hosp Day # 1 PCP ERYN MORALEZ     Subjective:     POD #1 Dr. Tatyana Valderrama who will see pt later this afternoon - RN aware    Meds:     • Heparin Sodium (Porcine)  5,000 Units Subcutaneous Q8H [address]   • piperacillin-tazobactam  3.375 g Intravenous Q8H       Results:       Recent Labs   Lab 01/10/21  0958 01/11/21  0559

## 2021-01-12 NOTE — PAYOR COMM NOTE
--------------  ADMISSION REVIEW     Secondary Payor: [address] [place] [address] Drive #:  [id number]  Secondary Payor Authorization Number: [id number]       ED Provider Notes        Patient Seen in: [facility] Emergency Department      History   Timoteo normal. There is no distension. Palpations: Abdomen is soft. Tenderness: There is abdominal tenderness in the right lower quadrant. There is no guarding or rebound. Musculoskeletal: Normal range of motion.    Skin:     General: Skin is warm and information is     transmitted to the ACR ([facility]) NRDR ([address]) which includes the Dose Index Registry. FINDINGS:     LIVER: Mild diffuse hepatic steatosis. No focal lesions.     GALLBLADDER: No Pulse: 80 82 76   Resp: 18 16 16   Temp: 97.9 °F (36.6 °C)     TempSrc: Oral     SpO2: 100% 99% 99%   Weight: 67.6 kg     Height: 165.1 cm (5' 5\")       *I personally reviewed and interpreted all ED vitals.        MDM      Differential Diagnosis includes diagnosis)    Disposition:  Admit  1/10/2021 11:42 am               H&P - H&P Note          Date:  1/10/2021  Date of Admission:  1/10/2021    History provided by: Patient   Chief Complaint:   Patient presents with:  Abdominal Pain      HPI:   Benito Leblanc inspiration  HEART:  S1 and S2 heard. RRR   LUNGS:  Air entry was good. No crackles or wheezes   ABDOMEN: Right lower quadrant tenderness Positive bowel sounds   MUSCULOSKELETAL:  There was no deformity.   There was full range of motion in all the extremi surgery    Full code        1/10 SURG    I discussed with pt and  options and risk of appendectomy. I explained reports and options of attempting tx with antibiotics only, however, I have recommended to proceed with surgery.  Explained risks of surge Discharge instructions reviewed with patient.     DVT Prophy: heparin subcutaneous, SCDs          1/11 CARDS    Acute appendicitis  Doing well after appendectomy.   Home when okay with surgery.     Hypertension  Continue losartan HCT and metoprolol and foll will be discharged home today.      Discharge Physical Exam:   Physical Exam:    General: No acute distress. Respiratory: Clear to auscultation bilaterally. No wheezes. No rhonchi. Cardiovascular: S1, S2. Regular rate and rhythm.  No murmurs, rubs or gal tablet by mouth daily.     Metoprolol Succinate ER 50 MG Oral Tablet 24 Hr  Take 50 mg by mouth daily.                                                                           Discharge Diet: Cardiac diet      Discharge Activity: As tolerated            nausea, stick to liquids until the nausea goes away.     Activity:    Rest today and resume normal activity tomorrow, May walk and may climb stairs, Avoid heavy lifting greater than 15 lbs and Avoid bending or straining for 2 weeks.   No driving for 5 days \"butterfly\" tapes intact. You may continue to shower after that. If skin glue was used,  you do not need to remove it and you may shower the day after surgery.     All incisions become somewhat hard and sometimes lumpy.  That is part of the normal healin

## 2021-02-12 NOTE — PAYOR COMM NOTE
--------------  DISCHARGE REVIEW    Payor: [address] [place] [address] Drive #:  [id number]  Authorization Number: [id number]/A    Admit date: 1/10/21  Admit time:  1634  Discharge Date: 1/11/2021  5:51 PM     Admitting Physician: Jo Cho MD  Attending Physician chest pain and sob. CT scan of the abdomen and pelvis showed acute appendicitis. Dr. Jie Villafuerte from general surgery was consulted. She went to the OR for an appendectomy. Cardiology also evaluated the patient and cleared her for surgery.  She did well postopera needed for Pain. No driving and no alcohol while taking this medication. Home Meds - Unchanged    atorvastatin 40 MG Oral Tab  Take 40 mg by mouth nightly. Olmesartan Medoxomil-HCTZ 40-25 MG Oral Tab  Take 1 tablet by mouth daily.     Metoprolol Suc Appendectomy    Diet:    Eat light foods tonight and resume normal diet tomorrow. If you develop nausea, stick to liquids until the nausea goes away.     Activity:    Rest today and resume normal activity tomorrow, May walk and may climb stairs, Avoid heav from the shower head. The tape bandage may be removed in 3 days and leave the [employer] \"butterfly\" tapes intact. You may continue to shower after that. If skin glue was used,  you do not need to remove it and you may shower the day after surgery.

## 2024-12-13 ENCOUNTER — APPOINTMENT (OUTPATIENT)
Dept: INTERVENTIONAL RADIOLOGY/VASCULAR | Facility: HOSPITAL | Age: 69
End: 2024-12-13
Attending: INTERNAL MEDICINE
Payer: MEDICARE

## 2024-12-13 ENCOUNTER — APPOINTMENT (OUTPATIENT)
Dept: CV DIAGNOSTICS | Facility: HOSPITAL | Age: 69
End: 2024-12-13
Attending: STUDENT IN AN ORGANIZED HEALTH CARE EDUCATION/TRAINING PROGRAM
Payer: MEDICARE

## 2024-12-13 ENCOUNTER — HOSPITAL ENCOUNTER (OUTPATIENT)
Facility: HOSPITAL | Age: 69
Setting detail: OBSERVATION
LOS: 1 days | Discharge: HOME | End: 2024-12-14
Attending: EMERGENCY MEDICINE | Admitting: HOSPITALIST
Payer: MEDICARE

## 2024-12-13 ENCOUNTER — APPOINTMENT (OUTPATIENT)
Dept: GENERAL RADIOLOGY | Facility: HOSPITAL | Age: 69
End: 2024-12-13
Attending: EMERGENCY MEDICINE
Payer: MEDICARE

## 2024-12-13 ENCOUNTER — APPOINTMENT (OUTPATIENT)
Dept: CT IMAGING | Facility: HOSPITAL | Age: 69
End: 2024-12-13
Attending: EMERGENCY MEDICINE
Payer: MEDICARE

## 2024-12-13 DIAGNOSIS — R07.9 CHEST PAIN OF UNCERTAIN ETIOLOGY: Primary | ICD-10-CM

## 2024-12-13 DIAGNOSIS — K21.9 GASTROESOPHAGEAL REFLUX DISEASE, UNSPECIFIED WHETHER ESOPHAGITIS PRESENT: ICD-10-CM

## 2024-12-13 PROBLEM — I24.9 ACS (ACUTE CORONARY SYNDROME) (HCC): Status: ACTIVE | Noted: 2024-12-13

## 2024-12-13 LAB
ALBUMIN SERPL-MCNC: 4.6 G/DL (ref 3.2–4.8)
ALBUMIN/GLOB SERPL: 1.6 {RATIO} (ref 1–2)
ALP LIVER SERPL-CCNC: 108 U/L
ALT SERPL-CCNC: 12 U/L
AMYLASE SERPL-CCNC: 71 U/L (ref 30–118)
ANION GAP SERPL CALC-SCNC: 6 MMOL/L (ref 0–18)
AST SERPL-CCNC: 23 U/L (ref ?–34)
ATRIAL RATE: 49 BPM
ATRIAL RATE: 58 BPM
BASOPHILS # BLD AUTO: 0.03 X10(3) UL (ref 0–0.2)
BASOPHILS NFR BLD AUTO: 0.3 %
BILIRUB SERPL-MCNC: 0.4 MG/DL (ref 0.2–1.1)
BUN BLD-MCNC: 15 MG/DL (ref 9–23)
BUN/CREAT SERPL: 14.6 (ref 10–20)
CALCIUM BLD-MCNC: 9.8 MG/DL (ref 8.7–10.4)
CHLORIDE SERPL-SCNC: 106 MMOL/L (ref 98–112)
CO2 SERPL-SCNC: 28 MMOL/L (ref 21–32)
CREAT BLD-MCNC: 1.03 MG/DL
D DIMER PPP FEU-MCNC: 0.34 UG/ML FEU (ref ?–0.7)
DEPRECATED RDW RBC AUTO: 50.7 FL (ref 35.1–46.3)
EGFRCR SERPLBLD CKD-EPI 2021: 58 ML/MIN/1.73M2 (ref 60–?)
EOSINOPHIL # BLD AUTO: 0.14 X10(3) UL (ref 0–0.7)
EOSINOPHIL NFR BLD AUTO: 1.4 %
ERYTHROCYTE [DISTWIDTH] IN BLOOD BY AUTOMATED COUNT: 13 % (ref 11–15)
GLOBULIN PLAS-MCNC: 2.9 G/DL (ref 2–3.5)
GLUCOSE BLD-MCNC: 102 MG/DL (ref 70–99)
HCT VFR BLD AUTO: 35.9 %
HGB BLD-MCNC: 11.9 G/DL
IMM GRANULOCYTES # BLD AUTO: 0.03 X10(3) UL (ref 0–1)
IMM GRANULOCYTES NFR BLD: 0.3 %
INR BLD: 0.9 (ref 0.8–1.2)
LIPASE SERPL-CCNC: 27 U/L (ref 12–53)
LYMPHOCYTES # BLD AUTO: 1.96 X10(3) UL (ref 1–4)
LYMPHOCYTES NFR BLD AUTO: 19.9 %
MCH RBC QN AUTO: 35.1 PG (ref 26–34)
MCHC RBC AUTO-ENTMCNC: 33.1 G/DL (ref 31–37)
MCV RBC AUTO: 105.9 FL
MONOCYTES # BLD AUTO: 0.85 X10(3) UL (ref 0.1–1)
MONOCYTES NFR BLD AUTO: 8.6 %
NEUTROPHILS # BLD AUTO: 6.84 X10 (3) UL (ref 1.5–7.7)
NEUTROPHILS # BLD AUTO: 6.84 X10(3) UL (ref 1.5–7.7)
NEUTROPHILS NFR BLD AUTO: 69.5 %
NT-PROBNP SERPL-MCNC: 163 PG/ML (ref ?–125)
OSMOLALITY SERPL CALC.SUM OF ELEC: 291 MOSM/KG (ref 275–295)
P AXIS: -18 DEGREES
P AXIS: -7 DEGREES
P-R INTERVAL: 150 MS
P-R INTERVAL: 154 MS
PLATELET # BLD AUTO: 277 10(3)UL (ref 150–450)
POTASSIUM SERPL-SCNC: 3.3 MMOL/L (ref 3.5–5.1)
PROT SERPL-MCNC: 7.5 G/DL (ref 5.7–8.2)
PROTHROMBIN TIME: 12.7 SECONDS (ref 11.6–14.8)
Q-T INTERVAL: 426 MS
Q-T INTERVAL: 464 MS
QRS DURATION: 90 MS
QRS DURATION: 94 MS
QTC CALCULATION (BEZET): 418 MS
QTC CALCULATION (BEZET): 419 MS
R AXIS: -33 DEGREES
R AXIS: -34 DEGREES
RBC # BLD AUTO: 3.39 X10(6)UL
SODIUM SERPL-SCNC: 140 MMOL/L (ref 136–145)
T AXIS: -9 DEGREES
T AXIS: -9 DEGREES
TROPONIN I SERPL HS-MCNC: 3 NG/L
TROPONIN I SERPL HS-MCNC: 3 NG/L
TROPONIN I SERPL HS-MCNC: <3 NG/L
VENTRICULAR RATE: 49 BPM
VENTRICULAR RATE: 58 BPM
WBC # BLD AUTO: 9.9 X10(3) UL (ref 4–11)

## 2024-12-13 PROCEDURE — 93306 TTE W/DOPPLER COMPLETE: CPT | Performed by: STUDENT IN AN ORGANIZED HEALTH CARE EDUCATION/TRAINING PROGRAM

## 2024-12-13 PROCEDURE — 71045 X-RAY EXAM CHEST 1 VIEW: CPT | Performed by: EMERGENCY MEDICINE

## 2024-12-13 PROCEDURE — B2151ZZ FLUOROSCOPY OF LEFT HEART USING LOW OSMOLAR CONTRAST: ICD-10-PCS | Performed by: INTERNAL MEDICINE

## 2024-12-13 PROCEDURE — B2111ZZ FLUOROSCOPY OF MULTIPLE CORONARY ARTERIES USING LOW OSMOLAR CONTRAST: ICD-10-PCS | Performed by: INTERNAL MEDICINE

## 2024-12-13 PROCEDURE — 99223 1ST HOSP IP/OBS HIGH 75: CPT | Performed by: INTERNAL MEDICINE

## 2024-12-13 PROCEDURE — 71275 CT ANGIOGRAPHY CHEST: CPT | Performed by: EMERGENCY MEDICINE

## 2024-12-13 PROCEDURE — B3101ZZ FLUOROSCOPY OF THORACIC AORTA USING LOW OSMOLAR CONTRAST: ICD-10-PCS | Performed by: INTERNAL MEDICINE

## 2024-12-13 PROCEDURE — 4A023N7 MEASUREMENT OF CARDIAC SAMPLING AND PRESSURE, LEFT HEART, PERCUTANEOUS APPROACH: ICD-10-PCS | Performed by: INTERNAL MEDICINE

## 2024-12-13 RX ORDER — LIDOCAINE HYDROCHLORIDE 20 MG/ML
INJECTION, SOLUTION EPIDURAL; INFILTRATION; INTRACAUDAL; PERINEURAL
Status: COMPLETED
Start: 2024-12-13 | End: 2024-12-13

## 2024-12-13 RX ORDER — MORPHINE SULFATE 4 MG/ML
4 INJECTION, SOLUTION INTRAMUSCULAR; INTRAVENOUS EVERY 2 HOUR PRN
Status: DISCONTINUED | OUTPATIENT
Start: 2024-12-13 | End: 2024-12-14

## 2024-12-13 RX ORDER — NITROGLYCERIN 20 MG/100ML
INJECTION INTRAVENOUS
Status: DISCONTINUED
Start: 2024-12-13 | End: 2024-12-13 | Stop reason: WASHOUT

## 2024-12-13 RX ORDER — NITROGLYCERIN 0.4 MG/1
0.4 TABLET SUBLINGUAL EVERY 5 MIN PRN
Status: DISCONTINUED | OUTPATIENT
Start: 2024-12-13 | End: 2024-12-14

## 2024-12-13 RX ORDER — HEPARIN SODIUM 1000 [USP'U]/ML
INJECTION, SOLUTION INTRAVENOUS; SUBCUTANEOUS
Status: COMPLETED
Start: 2024-12-13 | End: 2024-12-13

## 2024-12-13 RX ORDER — ACETAMINOPHEN 500 MG
500 TABLET ORAL EVERY 4 HOURS PRN
Status: DISCONTINUED | OUTPATIENT
Start: 2024-12-13 | End: 2024-12-14

## 2024-12-13 RX ORDER — SENNOSIDES 8.6 MG
17.2 TABLET ORAL NIGHTLY PRN
Status: DISCONTINUED | OUTPATIENT
Start: 2024-12-13 | End: 2024-12-14

## 2024-12-13 RX ORDER — BISACODYL 10 MG
10 SUPPOSITORY, RECTAL RECTAL
Status: DISCONTINUED | OUTPATIENT
Start: 2024-12-13 | End: 2024-12-14

## 2024-12-13 RX ORDER — ASPIRIN 81 MG/1
243 TABLET, CHEWABLE ORAL ONCE
Status: COMPLETED | OUTPATIENT
Start: 2024-12-13 | End: 2024-12-13

## 2024-12-13 RX ORDER — ONDANSETRON 2 MG/ML
4 INJECTION INTRAMUSCULAR; INTRAVENOUS EVERY 6 HOURS PRN
Status: DISCONTINUED | OUTPATIENT
Start: 2024-12-13 | End: 2024-12-14

## 2024-12-13 RX ORDER — SODIUM PHOSPHATE, DIBASIC AND SODIUM PHOSPHATE, MONOBASIC 7; 19 G/230ML; G/230ML
1 ENEMA RECTAL ONCE AS NEEDED
Status: DISCONTINUED | OUTPATIENT
Start: 2024-12-13 | End: 2024-12-14

## 2024-12-13 RX ORDER — IOPAMIDOL 612 MG/ML
60 INJECTION, SOLUTION INTRAVASCULAR
Status: COMPLETED | OUTPATIENT
Start: 2024-12-13 | End: 2024-12-13

## 2024-12-13 RX ORDER — VERAPAMIL HYDROCHLORIDE 2.5 MG/ML
INJECTION, SOLUTION INTRAVENOUS
Status: DISCONTINUED
Start: 2024-12-13 | End: 2024-12-13 | Stop reason: WASHOUT

## 2024-12-13 RX ORDER — MIDAZOLAM HYDROCHLORIDE 1 MG/ML
INJECTION INTRAMUSCULAR; INTRAVENOUS
Status: COMPLETED
Start: 2024-12-13 | End: 2024-12-13

## 2024-12-13 RX ORDER — MORPHINE SULFATE 2 MG/ML
1 INJECTION, SOLUTION INTRAMUSCULAR; INTRAVENOUS EVERY 2 HOUR PRN
Status: DISCONTINUED | OUTPATIENT
Start: 2024-12-13 | End: 2024-12-14

## 2024-12-13 RX ORDER — MORPHINE SULFATE 2 MG/ML
2 INJECTION, SOLUTION INTRAMUSCULAR; INTRAVENOUS EVERY 2 HOUR PRN
Status: DISCONTINUED | OUTPATIENT
Start: 2024-12-13 | End: 2024-12-14

## 2024-12-13 RX ORDER — POLYETHYLENE GLYCOL 3350 17 G/17G
17 POWDER, FOR SOLUTION ORAL DAILY PRN
Status: DISCONTINUED | OUTPATIENT
Start: 2024-12-13 | End: 2024-12-14

## 2024-12-13 RX ORDER — METOCLOPRAMIDE HYDROCHLORIDE 5 MG/ML
5 INJECTION INTRAMUSCULAR; INTRAVENOUS EVERY 8 HOURS PRN
Status: DISCONTINUED | OUTPATIENT
Start: 2024-12-13 | End: 2024-12-14

## 2024-12-13 RX ORDER — POTASSIUM CHLORIDE 1500 MG/1
40 TABLET, EXTENDED RELEASE ORAL EVERY 4 HOURS
Status: COMPLETED | OUTPATIENT
Start: 2024-12-13 | End: 2024-12-13

## 2024-12-13 NOTE — ED PROVIDER NOTES
Patient Seen in: [facility]         EMERGENCY DEPARTMENT NOTE    Dictated. Voice Transcription software has been utilized for this dictation (the reader should be aware that typographical errors are possible with voice transcription software and to please contact the dictating physician if there are questions.)         History     Chief Complaint   Patient presents with   • Chest Pain       There may be discrepancies from triage note.     HPI    History provided by patient and patient's daughter.  69-year-old female who reports hypercholesterolemia, hypertension complaining of left-sided chest pain radiating to left upper back and tingling to her left arm that started around 7 AM.  Cannot inform me if symptoms are worse with exertion.  Mild shortness of breath.  No pleuritic pain.  No alleviating or exacerbating factors.  Denies history of ACS, PE, DVT.  Reports having a stress test in the past which was negative however cannot inform me when.  Chart review reveals patient had a CTA of her chest 10/5/2023 revealing an ascending aortic aneurysm measuring 4.6 cm    No fevers, chills, nausea, vomiting, diarrhea, constipation, cough, cold symptoms, urinary complaints.  No headache, neck pain, neck stiffness, incontinence.  No changes in mentation, no changes in vision, no total/new extremity weakness, no total/new extremity paresthesia, no difficulty speaking.  No alleviating or exacerbating factors.  Denies orthopnea, pnd, change in exercise tolerance limited by chest pain/sob , lower extremity edema/asymmetry.     History reviewed. History reviewed. No pertinent past medical history.    History reviewed.   Past Surgical History:   Procedure Laterality Date   • Appendectomy  01/10/2021   • Eye surgery           Medications :  Prescriptions Prior to Admission[1]     History reviewed. No pertinent family history.    Smoking Status:   Social History     Socioeconomic History   • Marital status:    Tobacco  Use   • Smoking status: Never       Review of Systems   Constitutional: Negative.    HENT: Negative.     Eyes: Negative.    Respiratory: Negative.     Cardiovascular:  Positive for chest pain.   Gastrointestinal: Negative.    Genitourinary: Negative.    Musculoskeletal: Negative.    Skin: Negative.    Neurological:  Positive for tingling.   Endo/Heme/Allergies: Negative.    Psychiatric/Behavioral: Negative.     All other systems reviewed and are negative.    Pertinent positives as listed.  All other organ systems are reviewed and are negative.    Constitutional and vital signs reviewed.      Social History and Family History elements reviewed from today, pertinent positives to the presenting problem noted.    Physical Exam     ED Triage Vitals [12/13/24 0852]   /80   Pulse 59   Resp 20   Temp 97.2 °F (36.2 °C)   Temp src Temporal   SpO2 96 %   O2 Device None (Room air)       All measures to prevent infection transmission during my interaction with the patient were taken. The patient was already wearing a droplet mask on my arrival to the room. Personal protective equipment including droplet mask, eye protection, and gloves were worn throughout the duration of the exam.  Handwashing was performed prior to and after the exam.  Stethoscope and any equipment used during my examination was cleaned with super sani-cloth germicidal wipes following the exam.     Physical Exam  Vitals and nursing note reviewed.   Constitutional:       General: She is not in acute distress.     Appearance: She is not ill-appearing or toxic-appearing.   Neck:      Vascular: Carotid bruit: .radial.   Cardiovascular:      Rate and Rhythm: Normal rate and regular rhythm.      Comments: Radial pulses 2+ bilaterally    Dorsalis pedis pulses 2+ bilaterally    Pulmonary:      Effort: Pulmonary effort is normal. No respiratory distress.      Breath sounds: No stridor. No wheezing, rhonchi or rales.   Chest:      Chest wall: No tenderness.    Abdominal:      General: There is no distension.      Palpations: Abdomen is soft.      Tenderness: There is no abdominal tenderness. There is no guarding or rebound.      Comments: Negative Gregorio sign, negative McBurney's point tenderness     Musculoskeletal:      Right lower leg: No edema.      Left lower leg: No edema.   Skin:     Capillary Refill: Capillary refill takes less than 2 seconds.      Coloration: Skin is not jaundiced or pale.      Findings: No bruising, erythema, lesion or rash.   Neurological:      General: No focal deficit present.      Mental Status: She is alert and oriented to person, place, and time.      Comments: Gross motor and sensory function intact symmetrically and bilaterally to upper extremities and lower extremities.   Psychiatric:         Mood and Affect: Mood normal.         Behavior: Behavior normal.         Review of prior notes in Care everywhere/Epic performed by myself:  Echo 2021 with nl EF , ascending aorta was mildly dilated 4.5         ED Course     If labs obtained, they are personally reviewed by myself:     Labs Reviewed   COMP METABOLIC PANEL (14) - Abnormal; Notable for the following components:       Result Value    Glucose 102 (*)     Potassium 3.3 (*)     Creatinine 1.03 (*)     eGFR-Cr 58 (*)     All other components within normal limits   CBC WITH DIFFERENTIAL WITH PLATELET - Abnormal; Notable for the following components:    RBC 3.39 (*)     HGB 11.9 (*)     .9 (*)     MCH 35.1 (*)     RDW-SD 50.7 (*)     All other components within normal limits   PRO BETA NATRIURETIC PEPTIDE - Abnormal; Notable for the following components:    Pro-Beta Natriuretic Peptide 163 (*)     All other components within normal limits   TROPONIN I HIGH SENSITIVITY - Normal   D-DIMER - Normal   PROTHROMBIN TIME (PT) - Normal   TROPONIN I HIGH SENSITIVITY   RAINBOW DRAW LAVENDER   RAINBOW DRAW LIGHT GREEN   RAINBOW DRAW BLUE       If radiologic studies ordered during today's ER  visit, my independent interpretation are seen directly below.  This is awaiting the radiologist's final interpretation.  Chest X-ray, independent interpretation completed by myself and awaiting formal radiology read:  No acute cardiopulmonary process, no obvious mass   CTA chest, independent interpretation of radiologic study completed by myself and awaiting formal radiologist interpretation: No pneumothorax      Imaging Results read by radiology in ED: CTA CHEST (CPT=71275)    Result Date: 12/13/2024  CONCLUSION:   No evidence of thoracic aortic dissection.  4.6 cm ascending thoracic aortic aneurysm not significantly changed since December 2018.    Dictated by (CST): Best Islas MD on 12/13/2024 at 10:17 AM     Finalized by (CST): Best Islas MD on 12/13/2024 at 10:22 AM          XR CHEST AP PORTABLE  (CPT=71045)    Result Date: 12/13/2024  CONCLUSION:  1. Mild CHF.    Dictated by (CST): J Luis Aguirre MD on 12/13/2024 at 9:45 AM     Finalized by (CST): J Luis Aguirre MD on 12/13/2024 at 9:46 AM               ED Medications Administered:   Medications   nitroglycerin (Nitrostat) SL tab 0.4 mg (0.4 mg Sublingual Given 12/13/24 0921)   aspirin chewable tab 243 mg (has no administration in time range)   sodium chloride 0.9 % IV bolus 500 mL (500 mL Intravenous New Bag 12/13/24 0920)   iopamidol 76% (ISOVUE-370) injection for power injector (80 mL Intravenous Given 12/13/24 0955)           Vitals:    12/13/24 0933 12/13/24 0935 12/13/24 0945 12/13/24 1015   BP: 95/64 94/62 105/73 119/78   BP Location: Left arm Right arm     Pulse:  (!) 45 55 69   Resp: 19 17 16 20   Temp:       TempSrc:       SpO2:  91% 100% 92%   Weight:       Height:         *I personally reviewed and interpreted all ED vitals.    Pulse Ox interpretation by myself: 96%, Room air, Normal     Monitor Interpretation by myself:   normal sinus rhythm    If Ekg obtained during today's visit, it is independently interpreted by myself directly  below:  EKG    Rate, intervals and axes as noted on EKG Report.  Rate: 58  Rhythm: Sinus Rhythm  Reading: no st elevation, no st depression,   Incomplete right bundle branch block.  T wave inversions noted in anterior leads V1 through V3 and lead III  No previous EKGs to compare this with           Medical Record Review: I personally reviewed available prior medical records for any recent pertinent discharge summaries, testing, and procedures and reviewed those reports.      [facility]     Medical decision making/ED Course:   69-year-old female with history of hyperlipidemia, hypertension complaining of left-sided chest pain which radiates to left upper back and tingling into her left hand.  Currently denies any tingling, equal radial pulses and neurologically vascularly intact.  Her symptoms seem concerning for ACS/CAD.  Poor history provided.  Has a history of a ascending aortic dilatation.  Initially nitroglycerin given and patient had an episode of hypotension however with IV fluids, blood pressure improved.  She denied improvement with 1 nitroglycerin.  CTA negative for dissection/worsening aneurysm.    BMP/LFTs normal.  CBC appears stable.  Hemoglobin of 11.9-she denies bleeding from any orifice.  BNP slightly elevated at 163 however she has no lower extremity edema, no hypoxia to suggest acute decompensated CHF.  D-dimer negative.  Initial troponin negative thankfully.  Repeat troponin at 1130.  Case discussed with both cardiology and hospitalist who agree with ER management.  ASA given.  Upon reevaluation, blood pressure improved after her episode of hypotension with IV fluids.  dissection, PE, cardiac arrhythmia, pericardial effusion, CHF, among other life-threatening medical conditions considered and seems unlikely given patient's history, exam, and appearance.  Pt agrees and is aware of plan.       Differential Diagnosis:  as listed above in medical decision making.   *Please note that in the presenting to the  emergency department, illness/injury that poses a threat to life or function is considered during this patient's initial evaluation.    The complexity of this visit is therefore inherently more complex given the need to consider life threatening pathology prior to any other etiology for this patient's visit.    The differential diagnosis and medical decision above exemplify this rationale.       Medical Decision Making  Problems Addressed:  Chest pain of uncertain etiology: acute illness or injury    Amount and/or Complexity of Data Reviewed  Independent Historian:      Details: Daughter at bedside who works as a nurse here in the hospital  External Data Reviewed: labs and notes.  Labs: ordered. Decision-making details documented in ED Course.  Radiology: ordered and independent interpretation performed. Decision-making details documented in ED Course.  ECG/medicine tests: ordered and independent interpretation performed. Decision-making details documented in ED Course.  Discussion of management or test interpretation with external provider(s): Hospitalist  Cardiologist      Risk  Decision regarding hospitalization.    Critical Care  Total time providing critical care: 32 minutes        ED Course as of 12/13/24 1030  ------------------------------------------------------------  Time: 12/13 0934  Comment: Systolic pressure of 95, patient receiving IV fluids.  No change in chest pain.  Will obtain CTA to rule out dissection/aneurysm rupture.  Patient to receive 1 L of IV fluids.  500 cc ordered initially and will give another 500  ------------------------------------------------------------  Time: 12/13 1026  Comment: Sbp 119, patient reports mild persistent pain, will give aspirin.  Thankfully CTA negative.       Vitals:    12/13/24 0933 12/13/24 0935 12/13/24 0945 12/13/24 1015   BP: 95/64 94/62 105/73 119/78   BP Location: Left arm Right arm     Pulse:  (!) 45 55 69   Resp: 19 17 16 20   Temp:       TempSrc:        SpO2:  91% 100% 92%   Weight:       Height:                 Complicating Factors: Significant medical problems that contribute to the complexity of this emergency room evaluation is listed above.    Condition upon leaving the department: Stable    Disposition and Plan     Clinical Impression:  1. Chest pain of uncertain etiology          Disposition:  Admit      Medications Prescribed:  Current Discharge Medication List                       [1] (Not in a hospital admission)

## 2024-12-13 NOTE — ED INITIAL ASSESSMENT (HPI)
Pt came in with daughter for left sided chest pain radiating to the left arm with shortness of breath started around 7 AM today.  Pt is A/Ox  4, breathing unlabored.  History of Hypertension.

## 2024-12-13 NOTE — H&P
History and Physical     Ottoniel Vazquez Patient Status:  Emergency    10/14/1955 MRN [id number]   Location [facility] EMERGENCY DEPARTMENT Attending Marilou Louie MD   Hosp Day # 0 PCP PHYSICIAN NONSTAFF     Chief Complaint: left sided chest pain radiating to the left arm with shortness of breath     Subjective:    Ottoniel Vazquez is a 69 year old female with ho HTN, DL , GERD, macrocytic anemia, Coronary artery calcification on CT, chronic gastritis, thoracic aneurysm without rupture, ho corneal ulcer admitted for L sided CP, SOB since this morning  History reported by ED and cardiology.  Patient taken for emergent left heart cath.        History/Other:      Past Medical History:History reviewed. No pertinent past medical history.     Past Surgical History:   Past Surgical History:   Procedure Laterality Date    Appendectomy  01/10/2021    Eye surgery         Social History:  reports that she has never smoked. She does not have any smokeless tobacco history on file.    Family History: History reviewed. No pertinent family history.    Allergies: Allergies[1]    Medications:  Medications Ordered Prior to Encounter[2]    Review of Systems:   In left heart cath    Objective:     /78   Pulse 69   Temp 97.2 °F (36.2 °C) (Temporal)   Resp 20   Ht 5' 4\" (1.626 m)   Wt 145 lb (65.8 kg)   SpO2 92%   BMI 24.89 kg/m²     Deferred patient in emergent cath    Results:      Labs:  Labs Last 24 Hours:   BMP     CBC    Other     Na 140 Cl 106 BUN 15 Glu 102   Hb 11.9   PTT - Procal -   K 3.3 CO2 28.0 Cr 1.03   WBC 9.9 >< .0  INR 0.90 CRP -   Renal Lytes Endo    Hct 35.9   Trop - D dim 0.34   eGFR - Ca 9.8 POC Gluc  -    LFT   pBNP 163 Lactic -   eGFR AA - PO4 - A1c -   AST 23 APk 108 Prot 7.5  LDL -     Mg - TSH -   ALT 12 T adelina 0.4 Alb 4.6          COVID-19 Lab Results    COVID-19  Lab Results   Component Value Date    COVID19 Not Detected 01/10/2021       Pro-Calcitonin  No results for  input(s): \"PCT\" in the last 168 hours.    Cardiac  Recent Labs   Lab 12/13/24  0909   PBNP 163*       Creatinine Kinase  No results for input(s): \"CK\" in the last 168 hours.    Inflammatory Markers  Recent Labs   Lab 12/13/24  0911   DDIMER 0.34       Imaging: Imaging data reviewed in Epic.    Assessment & Plan:        L CP/SOB/ongoing chest pain 8 out of 10  -CTA: No Pe, No evidence of thoracic aortic dissection.  4.6 cm ascending thoracic aortic aneurysm not significantly changed since December 2018.  Trop: neg x 1  Dimer neg  EKG sinus BC with sinus arrhythmia , RSR pattern, non specific tw changes, LVH  Given one dose NTG but Unable to give 2nd dose NTG due to Hypotension  Cardiology consulted: Patient taken for emergent LHC.  Trend CE  Admit to tele        Hypotensive: post NGT: Resolved  BP: 95/64.   Pt bradycardic with HR of 46   No AMS,fluid overload      HTN:  Hold off antihypertensives until blood pressure improves post nitroglycerin given in ED.  Management per cardiology    DL:  Resume home dose statin      Quality:  DVT Prophylaxis:scd  CODE status: full        Marilou Louie MD  12/13/2024    Supplementary Documentation:                                    [1]   Allergies  Allergen Reactions    Codeine NAUSEA ONLY and NAUSEA AND VOMITING     Cerner Allergy Text Annotation: codeine  Cerner Allergy Text Annotation: codeine  Cerner Allergy Text Annotation: codeine  Cerner Allergy Text Annotation: codeine     [2]   No current facility-administered medications on file prior to encounter.     Current Outpatient Medications on File Prior to Encounter   Medication Sig Dispense Refill    atorvastatin 40 MG Oral Tab Take 1 tablet (40 mg total) by mouth nightly.      Olmesartan Medoxomil-HCTZ 40-25 MG Oral Tab Take 1 tablet by mouth daily.      Metoprolol Succinate ER 50 MG Oral Tablet 24 Hr Take 1 tablet (50 mg total) by mouth daily.      HYDROcodone-acetaminophen (NORCO) 5-325 MG Oral Tab Take 1 tablet by  mouth every 6 (six) hours as needed for Pain. No driving and no alcohol while taking this medication. (Patient not taking: Reported on 1/21/2021) 10 tablet 0

## 2024-12-13 NOTE — PROCEDURES
[facility]  part of [facility]    Cardiac Cath Procedure Note    Ottoniel Vazquez Patient Status:  Observation    10/14/1955 MRN [id number]   Location [facility] 3W/SW Attending Marilou Louie MD   Hosp Day # 0 PCP PHYSICIAN NONSTAFF       Cardiologist: Darius Lindo MD  Primary Proceduralist: Darius Lindo MD  Procedure Performed: LHC, COR, LV, and aortic root angiogram  Date of Procedure: 2024   Indication: Unstable angina    Summary of procedure:    Nonobstructive coronary disease  Stable ascending aortic aneurysm, no aortic regurgitation  Normal ejection fraction without any significant wall motion abnormalities      Recommendations:  GI workup, trial of GI cocktail  Evaluate for pancreatitis given her alcohol consumption      Left Ventriculography and hemodynamics:   LV EF 65%, no regional wall motion abnormalities  LV EDP 18 mmHg  No gradient across aortic valve    Aortic root angiogram with aortic aneurysm known by CT however no visualized dissection.  No aortic regurgitation.      Coronary Angiography  RCA:  Dominant and free of obstructive disease, supplies PDA and PL    Left main:  Free of obstructive disease    Left anterior descending:  Free of obstructive disease, supplies multiple diagonals which are non-obstructive    Circumflex:  Free of obstructive disease, supplies multiple OM branches which are patent        Description of Procedure:   After written informed consent was obtained from the patient, patient was brought to the cardiac catheterization laboratory.  Patient was prepped and draped in the usual sterile fashion. Lidocaine 1% was used to infiltrate the right groin for local anesthesia and a 6 Niuean introducer sheath was inserted into the right femoral artery via ultrasound guidance and micropuncture kit.      Selective coronary angiography performed with JR4 catheter for RCA and JL4 catheter for LCA.  Angiography performed in standard projections.      6  Latvian JR4 catheter placed in LV for hemodynamics.  6 Uzbek pigtail catheter for LV and aortic root angiograms    Selective right femoral angiogram done assess anatomy for closure.      Specimen sent to: No specimen collected  Estimated blood loss: 10 cc  Closure:  Perclose       IV was maintained by RN and moderate conscious sedation of versed and fentanyl was given.  Patient was assessed and monitoring of oxygen, heart rate and blood pressure by nurse and myself during the exam 35 minutes.      Darius Lindo MD  12/13/24

## 2024-12-13 NOTE — PROGRESS NOTES
Orders for admission, patient is aware of plan and ready to go upstairs. Any questions, please call ED [facility] [place] at extension [phone].     Patient Covid vaccination status: Fully vaccinated     COVID Test Ordered in ED: None    COVID Suspicion at Admission: N/A    Running Infusions:      Mental Status/LOC at time of transport: x4    Other pertinent information:   CIWA score: N/A   NIH score:  N/A

## 2024-12-13 NOTE — CONSULTS
Cardiology Consult Note    Ottoniel Vazquez Patient Status:  Emergency    10/14/1955 MRN [id number]   Location [facility] EMERGENCY DEPARTMENT Attending Gisell Decker MD   Hosp Day # 0 PCP PHYSICIAN NONSTAFF     69-year-old female presents with chest pain described as heaviness on her chest which started at 7 AM this morning, mild shortness of breath.  Chest pain radiated to the center of her back, history of ascending aorta dilation which had been taken care of at [facility].      ED work up  Troponin negative x 1, EKG right bundle branch block with Wellens T wave inversions anteriorly and inferiorly which are somewhat worse from baseline compared to  EKG  CTA chest done for dissection with stable 4.6 cm ascending thoracic aneurysm, no dissection  proBNP 863, chest x-ray with mild fluid overload  D-dimer negative   Hemoglobin 11.9, baseline nines      Assessment:    Chest pain: Somewhat concerning given new EKG changes.  CT chest reviewed, no pulmonary embolus, concern for RCA occlusion.  Ascending thoracic aneurysm: CT this admission 4.6 cm stable from prior 2023 done at [facility].  Hypertension, hyperlipidemia      Plan:  Angiogram now  Echocardiogram to follow      Level of care: C5    Darius Lindo MD  Interventional Cardiology  [facility]    --------------------------------------------------------------------------------------------------------------------------------  ROS 10 systems reviewed, pertinent findings above.  ROS    History:  History reviewed. No pertinent past medical history.  Past Surgical History:   Procedure Laterality Date    Appendectomy  01/10/2021    Eye surgery       History reviewed. No pertinent family history.   reports that she has never smoked. She does not have any smokeless tobacco history on file.    Objective:   Temp: 97.2 °F (36.2 °C)  Pulse: 69  Resp: 20  BP: 119/78    Intake/Output:     Intake/Output Summary (Last 24 hours) at 2024  1036  Last data filed at 12/13/2024 1036  Gross per 24 hour   Intake 500 ml   Output --   Net 500 ml       Physical Exam:     General: NAD  HEENT: Normocephalic, anicteric sclera, neck supple.  Neck: No JVD, carotids 2+, no bruits.  Cardiac: Regular rate and rhythm. S1, S2 normal. No murmur, pericardial rub, S3.  Lungs: Clear without wheezes, rales, rhonchi or dullness.  Normal excursions and effort.  Abdomen: Soft, non-tender. BS-present.  Extremities: Without clubbing, cyanosis or edema.  Peripheral pulses are 2+.  Neurologic: Non-focal  Skin: Warm and dry.       Darius Lindo MD  12/13/2024  10:36 AM

## 2024-12-13 NOTE — ED QUICK NOTES
Second dose of nitroglycerin held pt hypotensive. BP: 95/64. Pt bradycardic with HR of 46. MD notified.  New orders for repeat EKG.

## 2024-12-14 VITALS
SYSTOLIC BLOOD PRESSURE: 103 MMHG | HEIGHT: 64 IN | BODY MASS INDEX: 25.71 KG/M2 | WEIGHT: 150.63 LBS | OXYGEN SATURATION: 93 % | TEMPERATURE: 99 F | HEART RATE: 70 BPM | RESPIRATION RATE: 24 BRPM | DIASTOLIC BLOOD PRESSURE: 73 MMHG

## 2024-12-14 LAB
ATRIAL RATE: 70 BPM
LIPASE SERPL-CCNC: 31 U/L (ref 12–53)
P AXIS: 0 DEGREES
P-R INTERVAL: 150 MS
POTASSIUM SERPL-SCNC: 4.3 MMOL/L (ref 3.5–5.1)
Q-T INTERVAL: 400 MS
QRS DURATION: 92 MS
QTC CALCULATION (BEZET): 432 MS
R AXIS: -35 DEGREES
T AXIS: -2 DEGREES
VENTRICULAR RATE: 70 BPM

## 2024-12-14 PROCEDURE — 99239 HOSP IP/OBS DSCHRG MGMT >30: CPT | Performed by: INTERNAL MEDICINE

## 2024-12-14 RX ORDER — MAGNESIUM HYDROXIDE/ALUMINUM HYDROXICE/SIMETHICONE 120; 1200; 1200 MG/30ML; MG/30ML; MG/30ML
15 SUSPENSION ORAL 4 TIMES DAILY PRN
Qty: 355 ML | Refills: 0 | Status: SHIPPED | OUTPATIENT
Start: 2024-12-14

## 2024-12-14 RX ORDER — METOPROLOL SUCCINATE 50 MG/1
50 TABLET, EXTENDED RELEASE ORAL DAILY
Status: DISCONTINUED | OUTPATIENT
Start: 2024-12-14 | End: 2024-12-14

## 2024-12-14 RX ORDER — PANTOPRAZOLE SODIUM 40 MG/1
40 TABLET, DELAYED RELEASE ORAL DAILY
Qty: 30 TABLET | Refills: 0 | Status: SHIPPED | OUTPATIENT
Start: 2024-12-14 | End: 2025-01-13

## 2024-12-14 RX ORDER — ATORVASTATIN CALCIUM 40 MG/1
40 TABLET, FILM COATED ORAL NIGHTLY
Status: DISCONTINUED | OUTPATIENT
Start: 2024-12-14 | End: 2024-12-14

## 2024-12-14 RX ORDER — FUROSEMIDE 20 MG/1
20 TABLET ORAL DAILY
Status: DISCONTINUED | OUTPATIENT
Start: 2024-12-14 | End: 2024-12-14

## 2024-12-14 NOTE — DISCHARGE SUMMARY
Discharge Summary     Ottoniel Vazquez Patient Status:  Observation    10/14/1955 MRN [id number]   Location [facility] 3W/SW Attending Marilou Louie MD   Hosp Day # 1 PCP PHYSICIAN NONSTAFF     Date of Admission: 2024  Date of Discharge: ***  Discharge Disposition: Home or Self Care    Discharge Diagnosis: ***    History of Present Illness: ***  {H&P Notes :8307}          Brief Synopsis: ***    Lace+ Score: 34  59-90 High Risk  29-58 Medium Risk  0-28   Low Risk       TCM Follow-Up Recommendation:  {Care Managers will evaluate the need for follow-up for all patients ages 50+, and high/moderate risk patients ages 25-49. Low risk patients (LACE < 29) will only be evaluated if the \"Still recommend for TCM follow-up\" option is selected from this list.:7396}    Procedures during hospitalization:   ***    Incidental or significant findings and recommendations (brief descriptions):  ***    Lab/Test results pending at Discharge:   ***    Consultants:  ***    Discharge Medication List:     Discharge Medications        ASK your doctor about these medications        Instructions Prescription details   atorvastatin 40 MG Tabs  Commonly known as: Lipitor      Take 1 tablet (40 mg total) by mouth nightly.   Refills: 0     HYDROcodone-acetaminophen 5-325 MG Tabs  Commonly known as: Norco      Take 1 tablet by mouth every 6 (six) hours as needed for Pain. No driving and no alcohol while taking this medication.   Quantity: 10 tablet  Refills: 0     metoprolol succinate ER 50 MG Tb24  Commonly known as: Toprol XL      Take 1 tablet (50 mg total) by mouth daily.   Refills: 0     Olmesartan Medoxomil-HCTZ 40-25 MG Tabs  Commonly known as: BENICAR HCT      Take 1 tablet by mouth daily.   Refills: 0              Follow-up appointment:   No follow-up provider specified.  Appointments for Next 30 Days 2024 - 2025      None            Supplementary Documentation:   [place] reviewed[place] ***    Vital  signs:  Temp:  [98.7 °F (37.1 °C)-99.7 °F (37.6 °C)] 98.7 °F (37.1 °C)  Pulse:  [68-88] 70  Resp:  [16-24] 24  BP: ()/(62-99) 103/73  SpO2:  [92 %-97 %] 93 %    Physical Exam:    General:  NAD  Cardiovascular:  S1, S2    -----------------------------------------------------------------------------------------------  PATIENT DISCHARGE INSTRUCTIONS: See electronic chart    Tip: Documentation requirements: For split shared discharge, BOTH providers need to document specific floor, unit, and time spent on the discharge.  The note needs to be signed by the provider with > 50% of time and bill under their NPI.   Time spent:  ***         Marilou Louie MD

## 2024-12-14 NOTE — PROGRESS NOTES
Progress Note  Ottoniel Vazquez Patient Status:  Observation    10/14/1955 MRN [id number]   Location [facility] 3W/SW Attending Marilou Louie MD   Hosp Day # 0 PCP PHYSICIAN NONSTAFF     Subjective:  Still with mild chest pain 2-3/10. Sporadic sob comes and go.   Otherwise no new complaints.     Objective:  /65 (BP Location: Right arm)   Pulse 77   Temp 98.7 °F (37.1 °C) (Oral)   Resp 24   Ht 5' 4\" (1.626 m)   Wt 150 lb 9.6 oz (68.3 kg)   SpO2 94%   BMI 25.85 kg/m²     Telemetry: sinus rhythm       Intake/Output:    Intake/Output Summary (Last 24 hours) at 2024 0816  Last data filed at 2024 0144  Gross per 24 hour   Intake 1231 ml   Output 1000 ml   Net 231 ml       Last 3 Weights   24 0144 150 lb 9.6 oz (68.3 kg)   24 1139 150 lb (68 kg)   24 0852 145 lb (65.8 kg)   01/10/21 0938 149 lb (67.6 kg)       Labs:  Recent Labs   Lab 24  0909 24  0138   *  --    BUN 15  --    CREATSERUM 1.03*  --    EGFRCR 58*  --    CA 9.8  --      --    K 3.3* 4.3     --    CO2 28.0  --      Recent Labs   Lab 24  0909   RBC 3.39*   HGB 11.9*   HCT 35.9   .9*   MCH 35.1*   MCHC 33.1   RDW 13.0   NEPRELIM 6.84   WBC 9.9   .0         Recent Labs   Lab 24  0909 24  1103 24  1246   TROPHS <3 3 3       Review of Systems:   Constitutional:No fevers, chills, fatigue or night sweats.  Respiratory: sporadic shortness of breath .  CV: + mild chest pain, palpitations, orthopnea, PND or dizziness .  GI: No nausea, vomiting or diarrhea. No blood in stools.  Neurologic: No seizures, tremors, weakness or numbness.     Physical Exam:    Gen: alert, oriented x 3, NAD  Heent: pupils equal, reactive. Mucous membranes moist.   Neck: no jvd  Cardiac: regular rate and rhythm, normal S1,S2, no murmur   Lungs: slight crackles   Abd: soft, NT/ND +bs  Ext: no edema  Skin: Warm, dry  Neuro: No focal deficits      ED work up  Troponin  negative x 3, EKG right bundle branch block with Wellens T wave inversions anteriorly and inferiorly which are somewhat worse from baseline compared to 2021 EKG  CTA chest done for dissection with stable 4.6 cm ascending thoracic aneurysm, no dissection  proBNP 163, chest x-ray with mild fluid overload  D-dimer negative   Hemoglobin 11.9, baseline nines      Assessment:  69-year-old female presented with chest pain described as heaviness on her chest which started at 7 AM this morning, mild shortness of breath.  Chest pain radiated to the center of her back, history of ascending aorta dilation which had been taken care of at [facility].     # Chest pain with new EKG changes  Trops x3   Mildly elevated proBNP on admission   S/p LHC 12/13 with nonobstructive CAD   Echo with LVEF 60-65%; no wall motion abnormalities; no significant valvular disease   Resume home dose statin and BB   # SOB - cardiac work-up overall unremarkable/ LVEF 60-65%   Might benefit from Low dose oral furosemide   # HTN   Brief hypotension post NGT   PTA: olmesartan Medoxomil-hydrochlorothiazide 40-25 resume when BP improves   # Ascending thoracic aneurysm - stable   CT this admission 4.6 cm stable from prior 2023 October done at [facility]   Resume BB       Plan:  Chest pain unlikely cardiac given overall normal cardia work-up  Try low dose oral furosemide given fluid signs on CXR and sob   Resume metoprolol succinate  Resume statin; might benefit from asa given know CAD   Hypokalemia on presentation could be due to hydrochlorothiazide ?  Replace K per protocol       Stable for discharge from cardiac perspective. Follows cardiology at [facility].     Plan of care discussed with patient, RN.      Domonique Ruiz, APRN  12/14/2024  8:16 AM  [phone]

## 2024-12-14 NOTE — PLAN OF CARE
Cleared for discharge. Vss. Patient to follow up with gi outpatient. Dc instructions given and reviewed with patient.   Problem: Patient Centered Care  Goal: Patient preferences are identified and integrated in the patient's plan of care  Description: Interventions:  - What would you like us to know as we care for you?   - Provide timely, complete, and accurate information to patient/family  - Incorporate patient and family knowledge, values, beliefs, and cultural backgrounds into the planning and delivery of care  - Encourage patient/family to participate in care and decision-making at the level they choose  - Honor patient and family perspectives and choices  Outcome: Adequate for Discharge     Problem: Patient/Family Goals  Goal: Patient/Family Long Term Goal  Description: Patient's Long Term Goal:     Interventions:  -   - See additional Care Plan goals for specific interventions  Outcome: Adequate for Discharge  Goal: Patient/Family Short Term Goal  Description: Patient's Short Term Goal:     Interventions:   -   - See additional Care Plan goals for specific interventions  Outcome: Adequate for Discharge

## 2024-12-14 NOTE — PLAN OF CARE
Problem: Patient Centered Care  Goal: Patient preferences are identified and integrated in the patient's plan of care  Description: Interventions:  - What would you like us to know as we care for you?   - Provide timely, complete, and accurate information to patient/family  - Incorporate patient and family knowledge, values, beliefs, and cultural backgrounds into the planning and delivery of care  - Encourage patient/family to participate in care and decision-making at the level they choose  - Honor patient and family perspectives and choices  Outcome: Progressing    Patient received A&Ox3, VSS from ED. Reported unchanged chest pain (pressure) 8/10 at arrival. Cardiology immediately saw patient, ordered Morphine for pain,  sent her to cardiac cath.   1415  Patient returned from cath lab with R groin access site c/d/I with no hematoma. Patient on bed rest for 6 hours. Patient reports chest pressure is 5/10. Pain medication declined by patient. PO fluids encouraged.       Call light in reach and safety needs met.

## (undated) DEVICE — ENDOPATH ETS-FLEX45 ARTICULATING ENDOSCOPIC LINEAR CUTTER, NO RELOAD: Brand: ENDOPATH

## (undated) DEVICE — TROCAR: Brand: KII FIOS FIRST ENTRY

## (undated) DEVICE — ETS45 RELOAD STANDARD 45MM: Brand: ENDOPATH

## (undated) DEVICE — NEEDLE HPO 18GA 1.5IN ECLPS

## (undated) DEVICE — Device

## (undated) DEVICE — ENCORE® LATEX MICRO SIZE 8, STERILE LATEX POWDER-FREE SURGICAL GLOVE: Brand: ENCORE

## (undated) DEVICE — ENDOPATH ETS45 2.5MM RELOADS (VASCULAR/THIN): Brand: ENDOPATH

## (undated) DEVICE — LAP CHOLE: Brand: MEDLINE INDUSTRIES, INC.

## (undated) DEVICE — TROCAR: Brand: KII® SLEEVE

## (undated) DEVICE — 12 ML SYRINGE LUER-LOCK TIP: Brand: MONOJECT

## (undated) DEVICE — ENCORE® LATEX ACCLAIM SIZE 8, STERILE LATEX POWDER-FREE SURGICAL GLOVE: Brand: ENCORE

## (undated) DEVICE — DERMABOND LIQUID ADHESIVE

## (undated) DEVICE — [HIGH FLOW INSUFFLATOR,  DO NOT USE IF PACKAGE IS DAMAGED,  KEEP DRY,  KEEP AWAY FROM SUNLIGHT,  PROTECT FROM HEAT AND RADIOACTIVE SOURCES.]: Brand: PNEUMOSURE

## (undated) DEVICE — SPCMN DTCHBLE POUCH 5X7 500ML

## (undated) DEVICE — UNDYED BRAIDED (POLYGLACTIN 910), SYNTHETIC ABSORBABLE SUTURE: Brand: COATED VICRYL

## (undated) DEVICE — SOL  .9 3000ML

## (undated) DEVICE — CAUTERY BLADE 2IN INS E1455

## (undated) DEVICE — SOL  .9 1000ML BTL

## (undated) DEVICE — SUTURE VICRYL 0 UR-6

## (undated) NOTE — LETTER
[facility]  Administration of Anesthesia  1. Flora Aguilar, or _________________________________ acting on her behalf, (Patient) (Dependent/Representative) request to receive anesthesia for my pending procedure/operation/treatment. 6. OBSTETRIC PATIENTS: Specific risks/consequences of spinal/epidural anesthesia may include itching, low blood pressure, difficulty urinating, slowing of the baby's heart rate and headache.  Rare risks include infections, high spinal block, spinal bleeding ___________________________________________________           _____________________________________________________  Date/Time                                                                                               Responsible person in case of minor

## (undated) NOTE — LETTER
[address] [facility] [address]  [address] [place], [place]      Authorization for Surgical Operation and Procedure     Date:___________                                                                                                         Time:_______ 4.   Should the need arise during my operation or immediate post-operative period, I also consent to the administration of blood and/or blood products.   Further, I understand that despite careful testing and screening of blood or blood products by su 8.   I recognize that in the event my procedure results in extended X-Ray/fluoroscopy time, I may develop a skin reaction. 9.  If I have a Do Not Attempt Resuscitation (DNAR) order in place, that status will be suspended while in the operating room, proc STATEMENT OF PHYSICIAN My signature below affirms that prior to the time of the procedure; I have explained to the patient and/or his/her legal representative, the risks and benefits involved in the proposed treatment and any reasonable alternative to the

## (undated) NOTE — LETTER
[place] [address]  Authorization for Invasive Procedures  Date: 12/13/2024           Time: 1300    I hereby authorize Dr. Lindo, my physician and his/her assistants (if applicable), which may include medical students, residents, and/or fellows, to perform the following surgical operation/ procedure and administer such anesthesia as may be determined necessary by my physician:  Operation/Procedure name (s)  Cardiac Catheterization, Left Ventricular Cineangiography, Bilateral Selective Coronary Angiography and/or Right Heart Catheterization; possible Percutaneous Transluminal Coronary Angioplasty, Coronary Atherectomy, Coronary Stent, Intracoronary Thrombolytic therapy, Antiplatelet therapy and/or Intravascular Ultrasound on Ottoniel Vazquez   2.   I recognize that during the surgical operation/procedure, unforeseen conditions may necessitate additional or different procedures than those listed above.  I, therefore, further authorize and request that the above-named surgeon, assistants, or designees perform such procedures as are, in their judgment, necessary and desirable.    3.   My surgeon/physician has discussed prior to my surgery the potential benefits, risks and side effects of this procedure; the likelihood of achieving goals; and potential problems that might occur during recuperation.  They also discussed reasonable alternatives to the procedure, including risks, benefits, and side effects related to the alternatives and risks related to not receiving this procedure.  I have had all my questions answered and I acknowledge that no guarantee has been made as to the result that may be obtained.    4.   Should the need arise during my operation/procedure, which includes change of level of care prior to discharge, I also consent to the administration of blood and/or blood products.  Further, I understand that despite careful testing and screening of blood or blood products by  collecting agencies, I may still be subject to ill effects as a result of receiving a blood transfusion and/or blood products.  The following are some, but not all, of the potential risks that can occur: fever and allergic reactions, hemolytic reactions, transmission of diseases such as Hepatitis, AIDS and Cytomegalovirus (CMV) and fluid overload.  In the event that I wish to have an autologous transfusion of my own blood, or a directed donor transfusion, I will discuss this with my physician.  Check only if Refusing Blood or Blood Products  I understand refusal of blood or blood products as deemed necessary by my physician may have serious consequences to my condition to include possible death. I hereby assume responsibility for my refusal and release the hospital, its personnel, and my physicians from any responsibility for the consequences of my refusal.          o  Refuse      5.   I authorize the use of any specimen, organs, tissues, body parts or foreign objects that may be removed from my body during the operation/procedure for diagnosis, research or teaching purposes and their subsequent disposal by hospital authorities.  I also authorize the release of specimen test results and/or written reports to my treating physician on the hospital medical staff or other referring or consulting physicians involved in my care, at the discretion of the Pathologist or my treating physician.    6.   I consent to the photographing or videotaping of the operations or procedures to be performed, including appropriate portions of my body for medical, scientific, or educational purposes, provided my identity is not revealed by the pictures or by descriptive texts accompanying them.  If the procedure has been photographed/videotaped, the surgeon will obtain the original picture, image, videotape or CD.  The hospital will not be responsible for storage, release or maintenance of the picture, image, tape or CD.    7.   I consent  to the presence of a  or observers in the operating room as deemed necessary by my physician or their designees.    8.   I recognize that in the event my procedure results in extended X-Ray/fluoroscopy time, I may develop a skin reaction.    9. If I have a Do Not Attempt Resuscitation (DNAR) order in place, that status will be suspended while in the operating room, procedural suite, and during the recovery period unless otherwise explicitly stated by me (or a person authorized to consent on my behalf). The surgeon or my attending physician will determine when the applicable recovery period ends for purposes of reinstating the DNAR order.  10. Patients having a sterilization procedure: I understand that if the procedure is successful the results will be permanent and it will therefore be impossible for me to inseminate, conceive, or bear children.  I also understand that the procedure is intended to result in sterility, although the result has not been guaranteed.   11. I acknowledge that my physician has explained sedation/analgesia administration to me including the risk and benefits I consent to the administration of sedation/analgesia as may be necessary or desirable in the judgment of my physician.    I CERTIFY THAT I HAVE READ AND FULLY UNDERSTAND THE ABOVE CONSENT TO OPERATION and/or OTHER PROCEDURE.        ____________________________________       _________________________________      ______________________________  Signature of Patient         Signature of Responsible Person        Printed Name of Responsible Person    ____________________________________      _________________________________      ______________________________       Signature of Witness          Relationship to Patient                       Date                                       Time  Patient Name: Ottoniel Torrez George  : 10/14/1955    Reviewed: 2024   Printed: 2024  Medical Record #: [id number]  Page 1 of 2           STATEMENT OF PHYSICIAN My signature below affirms that prior to the time of the procedure; I have explained to the patient and/or his/her legal representative, the risks and benefits involved in the proposed treatment and any reasonable alternative to the proposed treatment. I have also explained the risks and benefits involved in refusal of the proposed treatment and alternatives to the proposed treatment and have answered the patient's questions. If I have a significant financial interest in a co-management agreement or a significant financial interest in any product or implant, or other significant relationship used in this procedure/surgery, I have disclosed this and had a discussion with my patient.     _______________________________________________________________ _____________________________  (Signature of Physician)                                                                                         (Date)                                   (Time)  Patient Name: Ottoniel Vazquez  : 10/14/1955    Reviewed: 2024   Printed: 2024  Medical Record #: [id number] Page 2 of 2